# Patient Record
Sex: FEMALE | Race: WHITE | NOT HISPANIC OR LATINO | Employment: OTHER | URBAN - METROPOLITAN AREA
[De-identification: names, ages, dates, MRNs, and addresses within clinical notes are randomized per-mention and may not be internally consistent; named-entity substitution may affect disease eponyms.]

---

## 2017-01-06 ENCOUNTER — OFFICE VISIT - HEALTHEAST (OUTPATIENT)
Dept: PHYSICAL THERAPY | Facility: REHABILITATION | Age: 67
End: 2017-01-06

## 2017-01-06 DIAGNOSIS — R26.9 ABNORMALITY OF GAIT: ICD-10-CM

## 2017-01-06 DIAGNOSIS — M62.81 GENERALIZED MUSCLE WEAKNESS: ICD-10-CM

## 2017-01-06 DIAGNOSIS — M25.662 KNEE STIFFNESS, LEFT: ICD-10-CM

## 2017-01-06 DIAGNOSIS — G89.29 CHRONIC PAIN OF LEFT KNEE: ICD-10-CM

## 2017-01-06 DIAGNOSIS — Z98.890 HISTORY OF SURGERY: ICD-10-CM

## 2017-01-06 DIAGNOSIS — R26.81 UNSTEADINESS ON FEET: ICD-10-CM

## 2017-01-06 DIAGNOSIS — M25.562 CHRONIC PAIN OF LEFT KNEE: ICD-10-CM

## 2017-03-14 ENCOUNTER — COMMUNICATION - HEALTHEAST (OUTPATIENT)
Dept: INTERNAL MEDICINE | Facility: CLINIC | Age: 67
End: 2017-03-14

## 2017-03-14 DIAGNOSIS — G89.29 CHRONIC PAIN: ICD-10-CM

## 2017-07-09 ENCOUNTER — COMMUNICATION - HEALTHEAST (OUTPATIENT)
Dept: INTERNAL MEDICINE | Facility: CLINIC | Age: 67
End: 2017-07-09

## 2017-08-09 ASSESSMENT — MIFFLIN-ST. JEOR: SCORE: 1916.61

## 2017-08-11 ENCOUNTER — RECORDS - HEALTHEAST (OUTPATIENT)
Dept: ADMINISTRATIVE | Facility: OTHER | Age: 67
End: 2017-08-11

## 2017-08-14 ENCOUNTER — ANESTHESIA - HEALTHEAST (OUTPATIENT)
Dept: SURGERY | Facility: CLINIC | Age: 67
End: 2017-08-14

## 2017-08-14 ENCOUNTER — SURGERY - HEALTHEAST (OUTPATIENT)
Dept: SURGERY | Facility: CLINIC | Age: 67
End: 2017-08-14

## 2017-08-14 ASSESSMENT — MIFFLIN-ST. JEOR: SCORE: 1925.69

## 2017-08-15 ASSESSMENT — MIFFLIN-ST. JEOR: SCORE: 1925.69

## 2017-08-22 ENCOUNTER — OFFICE VISIT - HEALTHEAST (OUTPATIENT)
Dept: GERIATRICS | Facility: CLINIC | Age: 67
End: 2017-08-22

## 2017-08-22 DIAGNOSIS — E11.9 DIABETES MELLITUS (H): ICD-10-CM

## 2017-08-22 DIAGNOSIS — R52 PAIN MANAGEMENT: ICD-10-CM

## 2017-08-22 DIAGNOSIS — Z96.651 HISTORY OF TOTAL KNEE ARTHROPLASTY, RIGHT: ICD-10-CM

## 2017-08-22 DIAGNOSIS — E87.5 HYPERKALEMIA: ICD-10-CM

## 2017-08-22 DIAGNOSIS — Z51.81 ANTICOAGULATION MANAGEMENT ENCOUNTER: ICD-10-CM

## 2017-08-22 DIAGNOSIS — E87.1 HYPONATREMIA: ICD-10-CM

## 2017-08-22 DIAGNOSIS — Z79.01 ANTICOAGULATION MANAGEMENT ENCOUNTER: ICD-10-CM

## 2017-08-23 ENCOUNTER — AMBULATORY - HEALTHEAST (OUTPATIENT)
Dept: GERIATRICS | Facility: CLINIC | Age: 67
End: 2017-08-23

## 2017-08-28 ENCOUNTER — RECORDS - HEALTHEAST (OUTPATIENT)
Dept: ADMINISTRATIVE | Facility: OTHER | Age: 67
End: 2017-08-28

## 2017-09-26 ENCOUNTER — OFFICE VISIT - HEALTHEAST (OUTPATIENT)
Dept: PHYSICAL THERAPY | Facility: REHABILITATION | Age: 67
End: 2017-09-26

## 2017-09-26 DIAGNOSIS — R26.81 UNSTEADY GAIT: ICD-10-CM

## 2017-09-26 DIAGNOSIS — G89.29 CHRONIC PAIN OF RIGHT KNEE: ICD-10-CM

## 2017-09-26 DIAGNOSIS — R26.81 UNSTEADINESS ON FEET: ICD-10-CM

## 2017-09-26 DIAGNOSIS — Z96.651 HISTORY OF TOTAL RIGHT KNEE REPLACEMENT: ICD-10-CM

## 2017-09-26 DIAGNOSIS — M62.81 GENERALIZED MUSCLE WEAKNESS: ICD-10-CM

## 2017-09-26 DIAGNOSIS — M25.561 CHRONIC PAIN OF RIGHT KNEE: ICD-10-CM

## 2017-09-26 DIAGNOSIS — M25.661 DECREASED ROM OF RIGHT KNEE: ICD-10-CM

## 2017-09-28 ENCOUNTER — OFFICE VISIT - HEALTHEAST (OUTPATIENT)
Dept: PHYSICAL THERAPY | Facility: REHABILITATION | Age: 67
End: 2017-09-28

## 2017-09-28 DIAGNOSIS — G89.29 CHRONIC PAIN OF RIGHT KNEE: ICD-10-CM

## 2017-09-28 DIAGNOSIS — Z96.651 HISTORY OF TOTAL RIGHT KNEE REPLACEMENT: ICD-10-CM

## 2017-09-28 DIAGNOSIS — M25.661 DECREASED ROM OF RIGHT KNEE: ICD-10-CM

## 2017-09-28 DIAGNOSIS — M25.561 CHRONIC PAIN OF RIGHT KNEE: ICD-10-CM

## 2017-09-28 DIAGNOSIS — R26.81 UNSTEADY GAIT: ICD-10-CM

## 2017-09-28 DIAGNOSIS — R26.81 UNSTEADINESS ON FEET: ICD-10-CM

## 2017-09-28 DIAGNOSIS — M62.81 GENERALIZED MUSCLE WEAKNESS: ICD-10-CM

## 2017-10-05 ENCOUNTER — OFFICE VISIT - HEALTHEAST (OUTPATIENT)
Dept: PHYSICAL THERAPY | Facility: REHABILITATION | Age: 67
End: 2017-10-05

## 2017-10-05 DIAGNOSIS — M25.561 CHRONIC PAIN OF RIGHT KNEE: ICD-10-CM

## 2017-10-05 DIAGNOSIS — M62.81 GENERALIZED MUSCLE WEAKNESS: ICD-10-CM

## 2017-10-05 DIAGNOSIS — G89.29 CHRONIC PAIN OF RIGHT KNEE: ICD-10-CM

## 2017-10-05 DIAGNOSIS — Z96.651 HISTORY OF TOTAL RIGHT KNEE REPLACEMENT: ICD-10-CM

## 2017-10-05 DIAGNOSIS — M25.661 DECREASED ROM OF RIGHT KNEE: ICD-10-CM

## 2017-10-05 DIAGNOSIS — R26.81 UNSTEADY GAIT: ICD-10-CM

## 2017-10-05 DIAGNOSIS — R26.81 UNSTEADINESS ON FEET: ICD-10-CM

## 2017-10-19 ENCOUNTER — OFFICE VISIT - HEALTHEAST (OUTPATIENT)
Dept: PHYSICAL THERAPY | Facility: REHABILITATION | Age: 67
End: 2017-10-19

## 2017-10-19 DIAGNOSIS — M25.561 BILATERAL KNEE PAIN: ICD-10-CM

## 2017-10-19 DIAGNOSIS — G89.29 CHRONIC PAIN OF LEFT KNEE: ICD-10-CM

## 2017-10-19 DIAGNOSIS — R26.9 ABNORMALITY OF GAIT: ICD-10-CM

## 2017-10-19 DIAGNOSIS — R26.81 UNSTEADY GAIT: ICD-10-CM

## 2017-10-19 DIAGNOSIS — M25.562 BILATERAL KNEE PAIN: ICD-10-CM

## 2017-10-19 DIAGNOSIS — M25.661 KNEE STIFFNESS, RIGHT: ICD-10-CM

## 2017-10-19 DIAGNOSIS — Z96.651 HISTORY OF TOTAL RIGHT KNEE REPLACEMENT: ICD-10-CM

## 2017-10-19 DIAGNOSIS — Z98.890 HISTORY OF SURGERY: ICD-10-CM

## 2017-10-19 DIAGNOSIS — M25.561 CHRONIC PAIN OF RIGHT KNEE: ICD-10-CM

## 2017-10-19 DIAGNOSIS — M25.661 DECREASED ROM OF RIGHT KNEE: ICD-10-CM

## 2017-10-19 DIAGNOSIS — M62.81 GENERALIZED MUSCLE WEAKNESS: ICD-10-CM

## 2017-10-19 DIAGNOSIS — G89.29 CHRONIC PAIN OF RIGHT KNEE: ICD-10-CM

## 2017-10-19 DIAGNOSIS — M25.562 CHRONIC PAIN OF LEFT KNEE: ICD-10-CM

## 2017-10-19 DIAGNOSIS — M25.662 KNEE STIFFNESS, LEFT: ICD-10-CM

## 2017-10-19 DIAGNOSIS — R26.81 UNSTEADINESS ON FEET: ICD-10-CM

## 2017-10-26 ENCOUNTER — OFFICE VISIT - HEALTHEAST (OUTPATIENT)
Dept: PHYSICAL THERAPY | Facility: REHABILITATION | Age: 67
End: 2017-10-26

## 2017-10-26 DIAGNOSIS — G89.29 CHRONIC PAIN OF RIGHT KNEE: ICD-10-CM

## 2017-10-26 DIAGNOSIS — R26.81 UNSTEADY GAIT: ICD-10-CM

## 2017-10-26 DIAGNOSIS — M25.661 DECREASED ROM OF RIGHT KNEE: ICD-10-CM

## 2017-10-26 DIAGNOSIS — M62.81 GENERALIZED MUSCLE WEAKNESS: ICD-10-CM

## 2017-10-26 DIAGNOSIS — Z96.651 HISTORY OF TOTAL RIGHT KNEE REPLACEMENT: ICD-10-CM

## 2017-10-26 DIAGNOSIS — M25.561 CHRONIC PAIN OF RIGHT KNEE: ICD-10-CM

## 2017-10-26 DIAGNOSIS — R26.81 UNSTEADINESS ON FEET: ICD-10-CM

## 2017-11-09 ENCOUNTER — OFFICE VISIT - HEALTHEAST (OUTPATIENT)
Dept: PHYSICAL THERAPY | Facility: REHABILITATION | Age: 67
End: 2017-11-09

## 2017-11-09 DIAGNOSIS — M25.561 CHRONIC PAIN OF RIGHT KNEE: ICD-10-CM

## 2017-11-09 DIAGNOSIS — M62.81 GENERALIZED MUSCLE WEAKNESS: ICD-10-CM

## 2017-11-09 DIAGNOSIS — R26.81 UNSTEADY GAIT: ICD-10-CM

## 2017-11-09 DIAGNOSIS — G89.29 CHRONIC PAIN OF RIGHT KNEE: ICD-10-CM

## 2017-11-09 DIAGNOSIS — R26.81 UNSTEADINESS ON FEET: ICD-10-CM

## 2017-11-09 DIAGNOSIS — M25.661 DECREASED ROM OF RIGHT KNEE: ICD-10-CM

## 2017-11-15 ENCOUNTER — OFFICE VISIT - HEALTHEAST (OUTPATIENT)
Dept: PHYSICAL THERAPY | Facility: REHABILITATION | Age: 67
End: 2017-11-15

## 2017-11-15 DIAGNOSIS — M25.661 DECREASED ROM OF RIGHT KNEE: ICD-10-CM

## 2017-11-15 DIAGNOSIS — M25.561 CHRONIC PAIN OF RIGHT KNEE: ICD-10-CM

## 2017-11-15 DIAGNOSIS — Z96.651 HISTORY OF TOTAL RIGHT KNEE REPLACEMENT: ICD-10-CM

## 2017-11-15 DIAGNOSIS — G89.29 CHRONIC PAIN OF RIGHT KNEE: ICD-10-CM

## 2017-11-15 DIAGNOSIS — M62.81 GENERALIZED MUSCLE WEAKNESS: ICD-10-CM

## 2017-11-15 DIAGNOSIS — R26.81 UNSTEADINESS ON FEET: ICD-10-CM

## 2017-11-15 DIAGNOSIS — R26.81 UNSTEADY GAIT: ICD-10-CM

## 2017-11-22 ENCOUNTER — OFFICE VISIT - HEALTHEAST (OUTPATIENT)
Dept: PHYSICAL THERAPY | Facility: REHABILITATION | Age: 67
End: 2017-11-22

## 2017-11-22 DIAGNOSIS — M25.661 DECREASED ROM OF RIGHT KNEE: ICD-10-CM

## 2017-11-22 DIAGNOSIS — M25.561 CHRONIC PAIN OF RIGHT KNEE: ICD-10-CM

## 2017-11-22 DIAGNOSIS — M62.81 GENERALIZED MUSCLE WEAKNESS: ICD-10-CM

## 2017-11-22 DIAGNOSIS — G89.29 CHRONIC PAIN OF RIGHT KNEE: ICD-10-CM

## 2017-11-22 DIAGNOSIS — Z96.651 HISTORY OF TOTAL RIGHT KNEE REPLACEMENT: ICD-10-CM

## 2017-11-22 DIAGNOSIS — R26.81 UNSTEADY GAIT: ICD-10-CM

## 2017-11-22 DIAGNOSIS — R26.81 UNSTEADINESS ON FEET: ICD-10-CM

## 2017-11-29 ENCOUNTER — OFFICE VISIT - HEALTHEAST (OUTPATIENT)
Dept: PHYSICAL THERAPY | Facility: REHABILITATION | Age: 67
End: 2017-11-29

## 2017-11-29 DIAGNOSIS — G89.29 CHRONIC PAIN OF RIGHT KNEE: ICD-10-CM

## 2017-11-29 DIAGNOSIS — Z96.651 HISTORY OF TOTAL RIGHT KNEE REPLACEMENT: ICD-10-CM

## 2017-11-29 DIAGNOSIS — R26.81 UNSTEADINESS ON FEET: ICD-10-CM

## 2017-11-29 DIAGNOSIS — M25.561 CHRONIC PAIN OF RIGHT KNEE: ICD-10-CM

## 2017-11-29 DIAGNOSIS — M25.661 DECREASED ROM OF RIGHT KNEE: ICD-10-CM

## 2017-11-29 DIAGNOSIS — M62.81 GENERALIZED MUSCLE WEAKNESS: ICD-10-CM

## 2017-11-29 DIAGNOSIS — R26.81 UNSTEADY GAIT: ICD-10-CM

## 2017-12-06 ENCOUNTER — OFFICE VISIT - HEALTHEAST (OUTPATIENT)
Dept: PHYSICAL THERAPY | Facility: REHABILITATION | Age: 67
End: 2017-12-06

## 2017-12-06 DIAGNOSIS — M25.561 CHRONIC PAIN OF RIGHT KNEE: ICD-10-CM

## 2017-12-06 DIAGNOSIS — G89.29 CHRONIC PAIN OF RIGHT KNEE: ICD-10-CM

## 2017-12-06 DIAGNOSIS — M25.661 DECREASED ROM OF RIGHT KNEE: ICD-10-CM

## 2017-12-06 DIAGNOSIS — R26.81 UNSTEADINESS ON FEET: ICD-10-CM

## 2017-12-06 DIAGNOSIS — M62.81 GENERALIZED MUSCLE WEAKNESS: ICD-10-CM

## 2017-12-06 DIAGNOSIS — R26.81 UNSTEADY GAIT: ICD-10-CM

## 2017-12-06 DIAGNOSIS — Z96.651 HISTORY OF TOTAL RIGHT KNEE REPLACEMENT: ICD-10-CM

## 2017-12-13 ENCOUNTER — OFFICE VISIT - HEALTHEAST (OUTPATIENT)
Dept: PHYSICAL THERAPY | Facility: REHABILITATION | Age: 67
End: 2017-12-13

## 2017-12-13 DIAGNOSIS — M25.561 CHRONIC PAIN OF RIGHT KNEE: ICD-10-CM

## 2017-12-13 DIAGNOSIS — R26.81 UNSTEADINESS ON FEET: ICD-10-CM

## 2017-12-13 DIAGNOSIS — R26.81 UNSTEADY GAIT: ICD-10-CM

## 2017-12-13 DIAGNOSIS — G89.29 CHRONIC PAIN OF RIGHT KNEE: ICD-10-CM

## 2017-12-13 DIAGNOSIS — Z96.651 HISTORY OF TOTAL RIGHT KNEE REPLACEMENT: ICD-10-CM

## 2017-12-13 DIAGNOSIS — M25.661 DECREASED ROM OF RIGHT KNEE: ICD-10-CM

## 2017-12-13 DIAGNOSIS — M62.81 GENERALIZED MUSCLE WEAKNESS: ICD-10-CM

## 2017-12-20 ENCOUNTER — OFFICE VISIT - HEALTHEAST (OUTPATIENT)
Dept: PHYSICAL THERAPY | Facility: REHABILITATION | Age: 67
End: 2017-12-20

## 2017-12-20 DIAGNOSIS — R26.81 UNSTEADINESS ON FEET: ICD-10-CM

## 2017-12-20 DIAGNOSIS — M62.81 GENERALIZED MUSCLE WEAKNESS: ICD-10-CM

## 2017-12-20 DIAGNOSIS — R26.81 UNSTEADY GAIT: ICD-10-CM

## 2017-12-20 DIAGNOSIS — M25.561 CHRONIC PAIN OF RIGHT KNEE: ICD-10-CM

## 2017-12-20 DIAGNOSIS — Z96.651 HISTORY OF TOTAL RIGHT KNEE REPLACEMENT: ICD-10-CM

## 2017-12-20 DIAGNOSIS — M25.661 DECREASED ROM OF RIGHT KNEE: ICD-10-CM

## 2017-12-20 DIAGNOSIS — G89.29 CHRONIC PAIN OF RIGHT KNEE: ICD-10-CM

## 2018-04-23 ENCOUNTER — AMBULATORY - HEALTHEAST (OUTPATIENT)
Dept: PODIATRY | Facility: CLINIC | Age: 68
End: 2018-04-23

## 2018-04-23 DIAGNOSIS — Z79.4 TYPE 2 DIABETES MELLITUS WITHOUT COMPLICATION, WITH LONG-TERM CURRENT USE OF INSULIN (H): ICD-10-CM

## 2018-04-23 DIAGNOSIS — E11.9 TYPE 2 DIABETES MELLITUS WITHOUT COMPLICATION, WITH LONG-TERM CURRENT USE OF INSULIN (H): ICD-10-CM

## 2018-04-23 DIAGNOSIS — M21.6X1 PRONATION DEFORMITY OF BOTH FEET: ICD-10-CM

## 2018-04-23 DIAGNOSIS — L60.2 ONYCHAUXIS: ICD-10-CM

## 2018-04-23 DIAGNOSIS — M21.6X2 PRONATION DEFORMITY OF BOTH FEET: ICD-10-CM

## 2018-04-23 DIAGNOSIS — L84 TYLOMA: ICD-10-CM

## 2018-04-27 ENCOUNTER — COMMUNICATION - HEALTHEAST (OUTPATIENT)
Dept: OTHER | Facility: CLINIC | Age: 68
End: 2018-04-27

## 2018-04-30 ENCOUNTER — COMMUNICATION - HEALTHEAST (OUTPATIENT)
Dept: SLEEP MEDICINE | Facility: CLINIC | Age: 68
End: 2018-04-30

## 2018-05-02 ENCOUNTER — OFFICE VISIT (OUTPATIENT)
Dept: OPHTHALMOLOGY | Facility: CLINIC | Age: 68
End: 2018-05-02
Attending: OPHTHALMOLOGY
Payer: MEDICARE

## 2018-05-02 DIAGNOSIS — E11.9 TYPE 2 DIABETES MELLITUS WITHOUT COMPLICATION, UNSPECIFIED LONG TERM INSULIN USE STATUS: ICD-10-CM

## 2018-05-02 DIAGNOSIS — H02.836 DERMATOCHALASIS OF EYELIDS OF BOTH EYES: ICD-10-CM

## 2018-05-02 DIAGNOSIS — H02.59 FLOPPY EYELID SYNDROME: ICD-10-CM

## 2018-05-02 DIAGNOSIS — H02.833 DERMATOCHALASIS OF EYELIDS OF BOTH EYES: ICD-10-CM

## 2018-05-02 DIAGNOSIS — Z96.1 PSEUDOPHAKIA OF BOTH EYES: Primary | ICD-10-CM

## 2018-05-02 PROCEDURE — G0463 HOSPITAL OUTPT CLINIC VISIT: HCPCS | Mod: ZF

## 2018-05-02 PROCEDURE — 92015 DETERMINE REFRACTIVE STATE: CPT | Mod: GY,ZF

## 2018-05-02 ASSESSMENT — CONF VISUAL FIELD
METHOD: COUNTING FINGERS
OD_NORMAL: 1
OS_NORMAL: 1

## 2018-05-02 ASSESSMENT — EXTERNAL EXAM - LEFT EYE: OS_EXAM: NORMAL

## 2018-05-02 ASSESSMENT — REFRACTION_MANIFEST
OS_SPHERE: -0.50
OS_CYLINDER: +0.50
OD_CYLINDER: +0.50
OS_ADD: +2.50
OD_AXIS: 165
OS_AXIS: 180
OD_ADD: +2.50
OD_SPHERE: -0.50

## 2018-05-02 ASSESSMENT — CUP TO DISC RATIO
OS_RATIO: 0.2
OD_RATIO: 0.2

## 2018-05-02 ASSESSMENT — TONOMETRY
OD_IOP_MMHG: 21
IOP_METHOD: TONOPEN
OS_IOP_MMHG: 21

## 2018-05-02 ASSESSMENT — VISUAL ACUITY
OD_SC+: -2
OD_SC: 20/25
OS_SC+: -1
METHOD: SNELLEN - LINEAR
OS_SC: 20/25

## 2018-05-02 NOTE — MR AVS SNAPSHOT
After Visit Summary   2018    Binta Dennis    MRN: [id number]           Patient Information     Date Of Birth          1950        Visit Information        Provider Department      2018 2:15 PM [facility]y [facility]azo [facility]        Today's Diagnoses     Pseudophakia of both eyes    -  1    Floppy eyelid syndrome        Type 2 diabetes mellitus without complication, unspecified long term insulin use status (H)        Dermatochalasis of eyelids of both eyes           Follow-ups after your visit        Additional Services     Plastics Eye Referral                 Follow-up notes from your care team     Return in about 1 year (around 2019) for Annual Visit.      Your next 10 appointments already scheduled     May 29, 2018  1:15 PM CDT   (Arrive by 1:00 PM)   NEW PLASTICS with Michelle Garner MD   [facility] ([facility])    [address] [place] [id number] [phone]              Who to contact     Please call your clinic at [phone] to:    Ask questions about your health    Make or cancel appointments    Discuss your medicines    Learn about your test results    Speak to your doctor            Additional Information About Your Visit        MyChart Information     [employer] is an electronic gateway that provides easy, online access to your medical records. With Bloxy, you can request a clinic appointment, read your test results, renew a prescription or communicate with your care team.     To sign up for [employer] visit the website at [email or web address]   You will be asked to enter the access code listed below, as well as some personal information. Please follow the directions to create your username and password.     Your access code is: [email or web address]  Expires: 2018  6:30 AM     Your access code will  in 90 days. If you need help or a new code, please contact your [facility] or call [phone] for assistance.        Care EveryWhere ID     This is your Care EveryWhere ID. This could be used by other organizations to access your [facility] medical records  [id number]         Blood Pressure from Last 3 Encounters:   05/16/16 95/54   08/17/15 126/69    Weight from Last 3 Encounters:   05/13/16 (!) 144.7 kg (319 lb)   08/14/15 (!) 145.2 kg (320 lb)              We Performed the Following     Plastics Eye Referral          Today's Medication Changes          These changes are accurate as of 5/2/18  3:10 PM.  If you have any questions, ask your nurse or doctor.               Stop taking these medicines if you haven't already. Please contact your care team if you have questions.     celecoxib 200 MG capsule   Commonly known as:  celeBREX   Stopped by:  Addy Razo MD           TRAMADOL HCL PO   Stopped by:  Addy Razo MD                    Primary Care Provider Office Phone # Fax #    Mike ZEPEDA MD Josef [phone]       [facility] [address] [place] [address]        Equal Access to Services     [facility]: Hadii aad ku hadasho Soomaali, waaxda luqadaha, qaybta kaalmada adeegyada, iqra ambrose . So [facility] [phone].    ATENCIÓN: Si habla español, tiene a garcia disposición servicios gratuitos de asistencia lingüística. Llame al [phone].    We comply with applicable federal civil rights laws and [place] laws. We do not discriminate on the basis of race, color, national origin, age, disability, sex, sexual orientation, or gender identity.            Thank you!     Thank you for choosing [facility]IC  for your care. Our goal is always to provide you with excellent care. Hearing back from our patients is one way we can continue to improve our services. Please take a few minutes to complete the written survey that you may receive in the mail after your visit with us. Thank you!              Your Updated Medication List - Protect others around you: Learn how to safely use, store and throw away your medicines at www.disposemymeds.org.          This list is accurate as of 5/2/18  3:10 PM.  Always use your most recent med list.                   Brand Name Dispense Instructions for use Diagnosis    ACETAMINOPHEN PO      Take by mouth as needed        allopurinol 300 MG tablet    ZYLOPRIM     Take 300 mg by mouth daily        ARTIFICIAL TEAR OP      Apply 1 drop to eye daily        hydrochlorothiazide 12.5 MG capsule    MICROZIDE     Take 25 mg by mouth daily        LEVEMIR FLEXPEN SC      Inject 39 Units Subcutaneous daily        lisinopril 10 MG tablet    PRINIVIL/ZESTRIL     Take 10 mg by mouth daily        metFORMIN 1000 MG tablet    GLUCOPHAGE     Take 1,000 mg by mouth 2 times daily (with meals)        prednisoLONE acetate 1 % ophthalmic susp    PRED FORTE    1 Bottle    Operated eye  Start the day after surgery 1 drop four times a day for 1 week, then three times a day for 1 week, then twice a day for 1 week, then once a day for 1 week, then stop    Cataract       SIMVASTATIN PO      Take 10 mg by mouth At Bedtime        VITAMIN D (CHOLECALCIFEROL) PO      Take by mouth daily

## 2018-05-02 NOTE — NURSING NOTE
Chief Complaints and History of Present Illnesses   Patient presents with     Diabetic Eye Exam     Hx of Pseudophakia - Both Eyes, Amblyopia, left, Borderline glaucoma with ocular hypertension, bilateral, Floppy eyelid syndrome, Diabetes mellitus without complication     HPI    Affected eye(s):  Both   Symptoms:     No blurred vision   No decreased vision         Do you have eye pain now?:  No      Comments:  Jenny LLAMAS 2:07 PM May 2, 2018

## 2018-05-02 NOTE — PROGRESS NOTES
"Chief Complaints and History of Present Illnesses   Patient presents with     Diabetic Eye Exam     Hx of Pseudophakia - Both Eyes, Amblyopia, left, Borderline glaucoma with ocular hypertension, bilateral, Floppy eyelid syndrome, Diabetes mellitus without complication     Patient presents for follow-up of pseudophakia both eyes, boarderline glaucoma, FES, and Diabetes mellitus.  Last A1c 11.7 on 1/25/18, patient admits poor compliance with blood sugars but doing better as of late.  States no new problems with her eyes today.      Assessment & Plan      Binta Dennis is a 65 year old female with the following diagnoses:   1. Pseudophakia of both eyes    2. Floppy eyelid syndrome    3. Type 2 diabetes mellitus without complication, unspecified long term insulin use status (H)         Very mild nonproliferative diabetic retinopathy changes in both eyes.   Discussed the importance of good blood pressure and sugar management at length, patient expresses understanding  The results of the refraction was discussed with the patient and a new glasses prescription was provided.        Bothered by drooping eyelids and \"buldging\" at nasal upper lids.  Recommend consult with oculoplastics, referral placed    Recheck 1 year       Pop Herrera M.D.  PGY-2, Ophthalmology     Attending Physician Attestation:  Complete documentation of historical and exam elements from today's encounter can be found in the full encounter summary report (not reduplicated in this progress note).  I personally obtained the chief complaint(s) and history of present illness.  I confirmed and edited as necessary the review of systems, past medical/surgical history, family history, social history, and examination findings as documented by others; and I examined the patient myself.  I personally reviewed the relevant tests, images, and reports as documented above.  I formulated and edited as necessary the assessment and plan and discussed the findings and " management plan with the patient and family. . - Addy Razo MD

## 2018-05-14 ENCOUNTER — AMBULATORY - HEALTHEAST (OUTPATIENT)
Dept: OTHER | Facility: CLINIC | Age: 68
End: 2018-05-14

## 2018-05-14 NOTE — TELEPHONE ENCOUNTER
FUTURE VISIT INFORMATION      FUTURE VISIT INFORMATION:    Date: 05/29/18    Time: 115pm    Location: [facility]  REFERRAL INFORMATION:    Referring provider:  LENO HORTON    Referring providers clinic:  [facility]    Reason for visit/diagnosis  PLASTIC EVAL    RECORDS REQUESTED FROM:       Clinic name Comments Records Status Imaging Status   [facility] Notes in EPIC In EPic                                    RECORDS STATUS

## 2018-05-23 ENCOUNTER — COMMUNICATION - HEALTHEAST (OUTPATIENT)
Dept: ADMINISTRATIVE | Facility: CLINIC | Age: 68
End: 2018-05-23

## 2018-05-24 ENCOUNTER — AMBULATORY - HEALTHEAST (OUTPATIENT)
Dept: OTHER | Facility: CLINIC | Age: 68
End: 2018-05-24

## 2018-05-24 DIAGNOSIS — L84 CORNS AND CALLOSITIES: ICD-10-CM

## 2018-05-24 DIAGNOSIS — M21.6X1 OTHER ACQUIRED DEFORMITIES OF RIGHT FOOT: ICD-10-CM

## 2018-05-24 DIAGNOSIS — E11.9 TYPE 2 DIABETES MELLITUS WITHOUT COMPLICATIONS (H): ICD-10-CM

## 2018-05-24 DIAGNOSIS — M21.6X2 OTHER ACQUIRED DEFORMITIES OF LEFT FOOT: ICD-10-CM

## 2018-05-29 ENCOUNTER — OFFICE VISIT (OUTPATIENT)
Dept: OPHTHALMOLOGY | Facility: CLINIC | Age: 68
End: 2018-05-29
Payer: COMMERCIAL

## 2018-05-29 ENCOUNTER — PRE VISIT (OUTPATIENT)
Dept: OPHTHALMOLOGY | Facility: CLINIC | Age: 68
End: 2018-05-29

## 2018-05-29 ENCOUNTER — DOCUMENTATION ONLY (OUTPATIENT)
Dept: OPHTHALMOLOGY | Facility: CLINIC | Age: 68
End: 2018-05-29

## 2018-05-29 VITALS — BODY MASS INDEX: 50.02 KG/M2 | WEIGHT: 293 LBS | HEIGHT: 64 IN

## 2018-05-29 DIAGNOSIS — H02.833 DERMATOCHALASIS OF EYELIDS OF BOTH EYES: ICD-10-CM

## 2018-05-29 DIAGNOSIS — H02.836 DERMATOCHALASIS OF EYELIDS OF BOTH EYES: ICD-10-CM

## 2018-05-29 DIAGNOSIS — H02.403 ACQUIRED INVOLUTIONAL PTOSIS OF BOTH EYELIDS: ICD-10-CM

## 2018-05-29 DIAGNOSIS — H02.59 FLOPPY EYELID SYNDROME: Primary | ICD-10-CM

## 2018-05-29 ASSESSMENT — VISUAL ACUITY
METHOD: SNELLEN - LINEAR
OS_SC+: -3
OD_SC: 20/25
OS_SC: 20/20
OD_SC+: -2

## 2018-05-29 ASSESSMENT — TONOMETRY
IOP_METHOD: ICARE
OD_IOP_MMHG: 22
OS_IOP_MMHG: 18

## 2018-05-29 ASSESSMENT — EXTERNAL EXAM - LEFT EYE: OS_EXAM: NORMAL

## 2018-05-29 NOTE — PROGRESS NOTES
Oculoplastic Clinic New Patient    Patient: Binta Dennis MRN# [id number]   YOB: 1950 Age: 67 year old   Date of Visit: May 29, 2018    CC: Droopy eyelids obstructing vision.  Chief Complaints and History of Present Illnesses   Patient presents with     Consult For     drooping eyelids            HPI:     Binta Dennis is a 67 year old female who has noted gradual onset of droopy eyelids over the past years. The droopy eyelid is interfering with activities of daily living including driving, and reading. The patient denies double vision, variability of the eyelid position, or dry eye symptoms.   Difficulty keeping eyes open when driving, has to really raise brows and make conscious effort; similar difficulty reading  Occasional dry eyes, uses artificial tears once and a while  No changes in vision  Hx Gout (allopurinol), HTN (HCTZ), DM II (Levemir insulin for glucose control), ELI uses CPAP  Strabismus surgery both eyes 20 yrs ago, cataract surgery    EXAM:     MRD1: 2mm R, 2mm L  Dermatochalasis with excess skin touching eyelashes   Prolapsed nasal fat pad    VISUAL FIELD:  Right eye untaped:18 degrees Right eye taped:52 degrees  Left eye untaped:14 degrees Left eye taped:52 degrees    Assessment & Plan     Binta Dennis is a 67 year old female with the following diagnoses:   Encounter Diagnoses   Name Primary?     Floppy eyelid syndrome Yes     Dermatochalasis of eyelids of both eyes      Acquired involutional ptosis of both eyelids      Her ptosis is significantly worse nasally    Bilateral ptosis repair external levator approach (Skin, N+CF, Nasal levator)        ANTICOAGULATION:  Aspirin 81 mg daily    Can hold prior to surgery  Will cc PCP regarding holding anticoagulation     PHOTOS DEMONSTRATE:    Myogenic blepharoptosis  Brow ptosis with thicker brow skin and hairs below the lateral superior orbital rim    David Short PGY-3  Plastic Surgery Resident    Complete documentation of historical  and exam elements from today's encounter can be found in the full encounter summary report (not reduplicated in this progress note). I personally obtained the chief complaint(s) and history of present illness.  I confirmed and edited as necessary the review of systems, past medical/surgical history, family history, social history, and examination findings as documented by others; and I examined the patient myself. I personally reviewed the relevant tests, images, and reports as documented above. I formulated and edited as necessary the assessment and plan and discussed the findings and management plan with the patient and family. - Michelle Garner MD      Today with Binta Dennis, I reviewed the indications, risks, benefits, and alternatives of the proposed surgical procedure including, but not limited to, failure obtain the desired result  and need for additional surgery, bleeding, infection, loss of vision, loss of the eye, and the remote possibility of permanent damage to any organ system or death with the use of anesthesia.  I provided multiple opportunities for the questions, answered all questions to the best of my ability, and confirmed that my answers and my discussion were understood.

## 2018-05-29 NOTE — LETTER
2018         RE:  :  MRN: Binta Dennis  1950  [id number]     Dear Dr. Razo,    Thank you for asking me to see your patient, Binta Dennis, for an oculoplastic   consultation.  My assessment and plan are below.      Oculoplastic Clinic New Patient     Patient: Binta Dennis MRN# [id number]   YOB: 1950 Age: 67 year old   Date of Visit: May 29, 2018     CC: Droopy eyelids obstructing vision.       Chief Complaints and History of Present Illnesses   Patient presents with     Consult For       drooping eyelids              HPI:      Binta Dennis is a 67 year old female who has noted gradual onset of droopy eyelids over the past years. The droopy eyelid is interfering with activities of daily living including driving, and reading. The patient denies double vision, variability of the eyelid position, or dry eye symptoms.   Difficulty keeping eyes open when driving, has to really raise brows and make conscious effort; similar difficulty reading  Occasional dry eyes, uses artificial tears once and a while  No changes in vision  Hx Gout (allopurinol), HTN (HCTZ), DM II (Levemir insulin for glucose control), ELI uses CPAP  Strabismus surgery both eyes 20 yrs ago, cataract surgery     EXAM:      MRD1: 2mm R, 2mm L  Dermatochalasis with excess skin touching eyelashes   Prolapsed nasal fat pad     VISUAL FIELD:  Right eye untaped:18            degrees                     Right eye taped:52 degrees  Left eye untaped:14              degrees                     Left eye taped:52 degrees     Assessment & Plan      Binta Dennis is a 67 year old female with the following diagnoses:        Encounter Diagnoses   Name Primary?     Floppy eyelid syndrome Yes     Dermatochalasis of eyelids of both eyes       Acquired involutional ptosis of both eyelids        Her ptosis is significantly worse nasally     Bilateral ptosis repair external levator approach (Skin, N+CF, Nasal  levator)           ANTICOAGULATION:  Aspirin 81 mg daily     Can hold prior to surgery  Will cc PCP regarding holding anticoagulation      PHOTOS DEMONSTRATE:     Myogenic blepharoptosis  Brow ptosis with thicker brow skin and hairs below the lateral superior orbital rim     David Short PGY-3  Plastic Surgery Resident         Again, thank you for allowing me to participate in the care of your patient.      Sincerely,    Michelle Garner MD  Department of Ophthalmology and Visual Neurosciences  [facility]    CC: Addy Razo MD  [address] [place] [id number]  VIA In Basket

## 2018-05-29 NOTE — NURSING NOTE
Chief Complaints and History of Present Illnesses   Patient presents with     Consult For     drooping eyelids     HPI    Affected eye(s):  Both   Symptoms:     No blurred vision   Difficulty with reading      Frequency:  Constant       Do you have eye pain now?:  No      Comments:  Patient states the lids are resting on lashes. Affecting vision when trying to read at night. Puffy medially both eyes. Having to hold lids open, difficult to keep up. Having issues driving in bright lights. Unable to wear eye makeup. Using ATs HAFSAN     Kasia Samano COT 12:35 PM May 29, 2018

## 2018-05-29 NOTE — PROGRESS NOTES
Met with patient to schedule surgery with Dr. Michelle Garner.  Surgery was scheduled on 08/03 at [facility] due to BMI  Patient will have H&P at [facility]  Post-Op care appointment was scheduled on 08/16  Patient is aware a / is needed day of surgery.   Patient received surgery packet has my direct contact information for any further questions.

## 2018-05-31 ENCOUNTER — AMBULATORY - HEALTHEAST (OUTPATIENT)
Dept: OTHER | Facility: CLINIC | Age: 68
End: 2018-05-31

## 2018-06-20 NOTE — PROGRESS NOTES
Patient called asking to reschedule surgery to September due to family unavailable to bring her in Aug.     Surgery was scheduled on 09/21  Patient will have H&P at [facility]  Post-Op care appointment will be on 10/02  Patient is aware a / is needed day of surgery.   Surgery packet was mailed, patient has my direct contact information for any further questions.

## 2018-09-21 ENCOUNTER — ANESTHESIA EVENT (OUTPATIENT)
Dept: SURGERY | Facility: CLINIC | Age: 68
End: 2018-09-21
Payer: MEDICARE

## 2018-09-21 ENCOUNTER — HOSPITAL ENCOUNTER (OUTPATIENT)
Facility: CLINIC | Age: 68
Discharge: HOME | End: 2018-09-21
Attending: OPHTHALMOLOGY | Admitting: OPHTHALMOLOGY
Payer: MEDICARE

## 2018-09-21 ENCOUNTER — SURGERY (OUTPATIENT)
Age: 68
End: 2018-09-21

## 2018-09-21 ENCOUNTER — ANESTHESIA (OUTPATIENT)
Dept: SURGERY | Facility: CLINIC | Age: 68
End: 2018-09-21
Payer: MEDICARE

## 2018-09-21 VITALS
RESPIRATION RATE: 15 BRPM | HEART RATE: 83 BPM | SYSTOLIC BLOOD PRESSURE: 100 MMHG | OXYGEN SATURATION: 94 % | HEIGHT: 64 IN | DIASTOLIC BLOOD PRESSURE: 47 MMHG | TEMPERATURE: 97.2 F | BODY MASS INDEX: 50.02 KG/M2 | WEIGHT: 293 LBS

## 2018-09-21 DIAGNOSIS — Z98.890 POSTOPERATIVE EYE STATE: Primary | ICD-10-CM

## 2018-09-21 LAB
GLUCOSE BLDC GLUCOMTR-MCNC: 151 MG/DL (ref 70–99)
GLUCOSE BLDC GLUCOMTR-MCNC: 162 MG/DL (ref 70–99)

## 2018-09-21 PROCEDURE — 71000013 ZZH RECOVERY PHASE 1 LEVEL 1 EA ADDTL HR: Performed by: OPHTHALMOLOGY

## 2018-09-21 PROCEDURE — 25000132 ZZH RX MED GY IP 250 OP 250 PS 637: Mod: GY | Performed by: ANESTHESIOLOGY

## 2018-09-21 PROCEDURE — 25000128 H RX IP 250 OP 636: Performed by: NURSE ANESTHETIST, CERTIFIED REGISTERED

## 2018-09-21 PROCEDURE — 27210794 ZZH OR GENERAL SUPPLY STERILE: Performed by: OPHTHALMOLOGY

## 2018-09-21 PROCEDURE — 36000058 ZZH SURGERY LEVEL 3 EA 15 ADDTL MIN: Performed by: OPHTHALMOLOGY

## 2018-09-21 PROCEDURE — 82962 GLUCOSE BLOOD TEST: CPT

## 2018-09-21 PROCEDURE — 36000056 ZZH SURGERY LEVEL 3 1ST 30 MIN: Performed by: OPHTHALMOLOGY

## 2018-09-21 PROCEDURE — 71000027 ZZH RECOVERY PHASE 2 EACH 15 MINS: Performed by: OPHTHALMOLOGY

## 2018-09-21 PROCEDURE — 37000009 ZZH ANESTHESIA TECHNICAL FEE, EACH ADDTL 15 MIN: Performed by: OPHTHALMOLOGY

## 2018-09-21 PROCEDURE — 40000170 ZZH STATISTIC PRE-PROCEDURE ASSESSMENT II: Performed by: OPHTHALMOLOGY

## 2018-09-21 PROCEDURE — 37000008 ZZH ANESTHESIA TECHNICAL FEE, 1ST 30 MIN: Performed by: OPHTHALMOLOGY

## 2018-09-21 PROCEDURE — 25000125 ZZHC RX 250: Performed by: NURSE ANESTHETIST, CERTIFIED REGISTERED

## 2018-09-21 PROCEDURE — 25000125 ZZHC RX 250: Performed by: OPHTHALMOLOGY

## 2018-09-21 PROCEDURE — A9270 NON-COVERED ITEM OR SERVICE: HCPCS | Mod: GY | Performed by: ANESTHESIOLOGY

## 2018-09-21 PROCEDURE — 71000012 ZZH RECOVERY PHASE 1 LEVEL 1 FIRST HR: Performed by: OPHTHALMOLOGY

## 2018-09-21 RX ORDER — ERYTHROMYCIN 5 MG/G
0.5 OINTMENT OPHTHALMIC 3 TIMES DAILY
Qty: 3.5 G | Refills: 0 | Status: SHIPPED | OUTPATIENT
Start: 2018-09-21 | End: 2018-09-28

## 2018-09-21 RX ORDER — SODIUM CHLORIDE, SODIUM LACTATE, POTASSIUM CHLORIDE, CALCIUM CHLORIDE 600; 310; 30; 20 MG/100ML; MG/100ML; MG/100ML; MG/100ML
INJECTION, SOLUTION INTRAVENOUS CONTINUOUS PRN
Status: DISCONTINUED | OUTPATIENT
Start: 2018-09-21 | End: 2018-09-21

## 2018-09-21 RX ORDER — FENTANYL CITRATE 50 UG/ML
25-50 INJECTION, SOLUTION INTRAMUSCULAR; INTRAVENOUS
Status: DISCONTINUED | OUTPATIENT
Start: 2018-09-21 | End: 2018-09-21 | Stop reason: HOSPADM

## 2018-09-21 RX ORDER — TRAMADOL HYDROCHLORIDE 50 MG/1
50 TABLET ORAL ONCE
Status: COMPLETED | OUTPATIENT
Start: 2018-09-21 | End: 2018-09-21

## 2018-09-21 RX ORDER — HYDRALAZINE HYDROCHLORIDE 20 MG/ML
2.5-5 INJECTION INTRAMUSCULAR; INTRAVENOUS EVERY 10 MIN PRN
Status: DISCONTINUED | OUTPATIENT
Start: 2018-09-21 | End: 2018-09-21 | Stop reason: HOSPADM

## 2018-09-21 RX ORDER — ONDANSETRON 4 MG/1
4 TABLET, ORALLY DISINTEGRATING ORAL EVERY 30 MIN PRN
Status: DISCONTINUED | OUTPATIENT
Start: 2018-09-21 | End: 2018-09-21 | Stop reason: HOSPADM

## 2018-09-21 RX ORDER — LIDOCAINE HYDROCHLORIDE 20 MG/ML
INJECTION, SOLUTION INFILTRATION; PERINEURAL PRN
Status: DISCONTINUED | OUTPATIENT
Start: 2018-09-21 | End: 2018-09-21

## 2018-09-21 RX ORDER — SODIUM CHLORIDE, SODIUM LACTATE, POTASSIUM CHLORIDE, CALCIUM CHLORIDE 600; 310; 30; 20 MG/100ML; MG/100ML; MG/100ML; MG/100ML
INJECTION, SOLUTION INTRAVENOUS CONTINUOUS
Status: DISCONTINUED | OUTPATIENT
Start: 2018-09-21 | End: 2018-09-21 | Stop reason: HOSPADM

## 2018-09-21 RX ORDER — ONDANSETRON 2 MG/ML
4 INJECTION INTRAMUSCULAR; INTRAVENOUS EVERY 30 MIN PRN
Status: DISCONTINUED | OUTPATIENT
Start: 2018-09-21 | End: 2018-09-21 | Stop reason: HOSPADM

## 2018-09-21 RX ORDER — NALOXONE HYDROCHLORIDE 0.4 MG/ML
.1-.4 INJECTION, SOLUTION INTRAMUSCULAR; INTRAVENOUS; SUBCUTANEOUS
Status: DISCONTINUED | OUTPATIENT
Start: 2018-09-21 | End: 2018-09-21 | Stop reason: HOSPADM

## 2018-09-21 RX ORDER — ERYTHROMYCIN 5 MG/G
OINTMENT OPHTHALMIC PRN
Status: DISCONTINUED | OUTPATIENT
Start: 2018-09-21 | End: 2018-09-21 | Stop reason: HOSPADM

## 2018-09-21 RX ORDER — PROPOFOL 10 MG/ML
INJECTION, EMULSION INTRAVENOUS PRN
Status: DISCONTINUED | OUTPATIENT
Start: 2018-09-21 | End: 2018-09-21

## 2018-09-21 RX ORDER — HYDROMORPHONE HYDROCHLORIDE 1 MG/ML
.3-.5 INJECTION, SOLUTION INTRAMUSCULAR; INTRAVENOUS; SUBCUTANEOUS EVERY 10 MIN PRN
Status: DISCONTINUED | OUTPATIENT
Start: 2018-09-21 | End: 2018-09-21 | Stop reason: HOSPADM

## 2018-09-21 RX ORDER — ALBUTEROL SULFATE 0.83 MG/ML
2.5 SOLUTION RESPIRATORY (INHALATION) EVERY 4 HOURS PRN
Status: DISCONTINUED | OUTPATIENT
Start: 2018-09-21 | End: 2018-09-21 | Stop reason: HOSPADM

## 2018-09-21 RX ORDER — FENTANYL CITRATE 50 UG/ML
INJECTION, SOLUTION INTRAMUSCULAR; INTRAVENOUS PRN
Status: DISCONTINUED | OUTPATIENT
Start: 2018-09-21 | End: 2018-09-21

## 2018-09-21 RX ORDER — TETRACAINE HYDROCHLORIDE 5 MG/ML
SOLUTION OPHTHALMIC PRN
Status: DISCONTINUED | OUTPATIENT
Start: 2018-09-21 | End: 2018-09-21 | Stop reason: HOSPADM

## 2018-09-21 RX ORDER — MEPERIDINE HYDROCHLORIDE 25 MG/ML
12.5 INJECTION INTRAMUSCULAR; INTRAVENOUS; SUBCUTANEOUS
Status: DISCONTINUED | OUTPATIENT
Start: 2018-09-21 | End: 2018-09-21 | Stop reason: HOSPADM

## 2018-09-21 RX ADMIN — LIDOCAINE HYDROCHLORIDE,EPINEPHRINE BITARTRATE 8 ML: 20; .01 INJECTION, SOLUTION INFILTRATION; PERINEURAL at 08:40

## 2018-09-21 RX ADMIN — TETRACAINE HYDROCHLORIDE 1 DROP: 5 SOLUTION OPHTHALMIC at 08:39

## 2018-09-21 RX ADMIN — FENTANYL CITRATE 50 MCG: 50 INJECTION, SOLUTION INTRAMUSCULAR; INTRAVENOUS at 08:40

## 2018-09-21 RX ADMIN — PROPOFOL 30 MG: 10 INJECTION, EMULSION INTRAVENOUS at 08:41

## 2018-09-21 RX ADMIN — MIDAZOLAM 1 MG: 1 INJECTION INTRAMUSCULAR; INTRAVENOUS at 08:40

## 2018-09-21 RX ADMIN — LIDOCAINE HYDROCHLORIDE 40 MG: 20 INJECTION, SOLUTION INFILTRATION; PERINEURAL at 08:40

## 2018-09-21 RX ADMIN — SODIUM CHLORIDE, POTASSIUM CHLORIDE, SODIUM LACTATE AND CALCIUM CHLORIDE: 600; 310; 30; 20 INJECTION, SOLUTION INTRAVENOUS at 08:33

## 2018-09-21 RX ADMIN — PHENYLEPHRINE HYDROCHLORIDE 100 MCG: 10 INJECTION, SOLUTION INTRAMUSCULAR; INTRAVENOUS; SUBCUTANEOUS at 09:14

## 2018-09-21 RX ADMIN — PHENYLEPHRINE HYDROCHLORIDE 100 MCG: 10 INJECTION, SOLUTION INTRAMUSCULAR; INTRAVENOUS; SUBCUTANEOUS at 08:54

## 2018-09-21 RX ADMIN — TRAMADOL HYDROCHLORIDE 50 MG: 50 TABLET, COATED ORAL at 10:39

## 2018-09-21 RX ADMIN — MIDAZOLAM 0.5 MG: 1 INJECTION INTRAMUSCULAR; INTRAVENOUS at 08:57

## 2018-09-21 RX ADMIN — PROPOFOL 20 MG: 10 INJECTION, EMULSION INTRAVENOUS at 08:42

## 2018-09-21 RX ADMIN — ERYTHROMYCIN 1 G: 5 OINTMENT OPHTHALMIC at 08:52

## 2018-09-21 RX ADMIN — MIDAZOLAM 0.5 MG: 1 INJECTION INTRAMUSCULAR; INTRAVENOUS at 09:00

## 2018-09-21 RX ADMIN — DEXMEDETOMIDINE HYDROCHLORIDE 8 MCG: 100 INJECTION, SOLUTION INTRAVENOUS at 08:40

## 2018-09-21 RX ADMIN — DEXMEDETOMIDINE HYDROCHLORIDE 8 MCG: 100 INJECTION, SOLUTION INTRAVENOUS at 08:55

## 2018-09-21 ASSESSMENT — ENCOUNTER SYMPTOMS
DYSRHYTHMIAS: 0
SEIZURES: 0

## 2018-09-21 ASSESSMENT — LIFESTYLE VARIABLES: TOBACCO_USE: 0

## 2018-09-21 ASSESSMENT — COPD QUESTIONNAIRES: COPD: 0

## 2018-09-21 NOTE — DISCHARGE INSTRUCTIONS
Post-operative Instructions  Ophthalmic Plastic and Reconstructive Surgery    Michelle Garner M.D.     All instructions apply to the operated eye(s) or eyelid(s).    Wound care and personal care  ? If a patch or bandage has been placed, please leave this in place until seen by your physician. Ensure that the bandage does not get wet when you take a shower.  ? Apply ice compresses 15 minutes on 15 minutes off while awake for 2 days, then switch to warm water compresses 4 times a day until seen by your physician. For warm packs you can place a cup of dry uncooked rice in a clean cotton sock. Then place sock in microwave 30 seconds to one minute. Next place the warm sock into a plastic bag and wrap the bag with clean warm wet washcloth and place over operated eye.    ? You may shower or wash your hair the day after surgery. Do not bathe or go swimming for 1 week to prevent contamination of your wounds.  ? Do not apply make-up to the eyes or eyelids for 2 weeks after surgery.  ? Expect some swelling, bruising, black eye (even into the lower eyelids and cheeks). Also expect serum caking, crusting and discharge from the eye and/or incisions. A small amount of surface bleeding is normal for the first 48 hours.  ? Your eye(s) and eyelid(s) may be painful and tender. This is normal after surgery.      Contact information and follow-up  ? Return to the Eye Clinic for a follow-up appointment with your physician as  scheduled. If no appointment has been scheduled:   - [facility] clinic: [phone] for an appointment with Dr. Garner within 1 to 2 weeks from your date of surgery.   -  [facility] eye clinic: [phone] for an appointment with Dr. Garner within 1 to 2 weeks from your date of surgery.     ? For severe pain, bleeding, or loss of vision, call the [facility] at [phone] or [facility] at [phone].     After hours or on weekends  and holidays, call [phone] and ask to speak with the ophthalmologist on call.    An on call person can be reached after hours for concerns. The on call doctor should not call in medication refill requests after hours or on weekends, so please plan accordingly. An effort has been made to provide adequate pain medications following every surgery, and refills will not be provided in most instances. Narcotic pain medications cannot be called in.     Activity restrictions and driving  ? Avoid heavy lifting, bending, exercise or strenuous activity for 1 week after surgery.  You may resume other activities and return to work as tolerated.  ? You may not resume driving until have you stopped using narcotic pain medications (such as Norco, Percocet, Tylenol #3).    Medications  ? Restart all your regular home medications and eye drops. If you take Plavix or  Aspirin on a regular basis, wait for 72 hours after your surgery before restarting these in order to decrease the risk of bleeding complications.  ? Avoid aspirin and aspirin-like medications (Motrin, Aleve, Ibuprofen, Nasra-  [place] etc) for 72 hours to reduce the risk of bleeding. You may take Tylenol  (acetaminophen) for pain.  ? In addition to your home medications, take the following post-operative medications as prescribed by your physician.    ? Apply antibiotic ointment to all sutures three times a day, and into the operated eye(s) at night.    ? WARNING:  You must not take more than 4,000 mg of acetaminophen per  24-hour period.       Same Day Surgery Discharge Instructions for  Sedation and General Anesthesia       It's not unusual to feel dizzy, light-headed or faint for up to 24 hours after surgery or while taking pain medication.  If you have these symptoms: sit for a few minutes before standing and have someone assist you when you get up to walk or use the bathroom.      You should rest and relax for the next 24 hours. We recommend you make  arrangements to have an adult stay with you for at least 24 hours after your discharge.  Avoid hazardous and strenuous activity.      DO NOT DRIVE any vehicle or operate mechanical equipment for 24 hours following the end of your surgery.  Even though you may feel normal, your reactions may be affected by the medication you have received.      Do not drink alcoholic beverages for 24 hours following surgery.       Slowly progress to your regular diet as you feel able. It's not unusual to feel nauseated and/or vomit after receiving anesthesia.  If you develop these symptoms, drink clear liquids (apple juice, ginger ale, broth, 7-up, etc. ) until you feel better.  If your nausea and vomiting persists for 24 hours, please notify your surgeon.        All narcotic pain medications, along with inactivity and anesthesia, can cause constipation. Drinking plenty of liquids and increasing fiber intake will help.      For any questions of a medical nature, call your surgeon.      Do not make important decisions for 24 hours.      If you had general anesthesia, you may have a sore throat for a couple of days related to the breathing tube used during surgery.  You may use Cepacol lozenges to help with this discomfort.  If it worsens or if you develop a fever, contact your surgeon.       If you feel your pain is not well managed with the pain medications prescribed by your surgeon, please contact your surgeon's office to let them know so they can address your concerns.             **If you have questions or concerns about your procedure,   call Dr. Garner at [phone]**    One tramadol taken at 10:42 for pain.

## 2018-09-21 NOTE — IP AVS SNAPSHOT
[facility] Day [facility]    [address]    [place] [address]    Phone:  [phone]    Fax:  [phone]                                       After Visit Summary   9/21/2018    Binta Dennis    MRN: [id number]           After Visit Summary Signature Page     I have received my discharge instructions, and my questions have been answered. I have discussed any challenges I see with this plan with the nurse or doctor.    ..........................................................................................................................................  Patient/Patient Representative Signature      ..........................................................................................................................................  Patient Representative Print Name and Relationship to Patient    ..................................................               ................................................  Date                                   Time    ..........................................................................................................................................  Reviewed by Signature/Title    ...................................................              ..............................................  Date                                               Time          22EPIC Rev 08/18

## 2018-09-21 NOTE — OP NOTE
PREOPERATIVE DIAGNOSIS: Ptosis, bilateral upper lid.   POSTOPERATIVE DIAGNOSIS:  Ptosis,bilateral upper lid.   PROCEDURE:Both  upper eyelid  ptosis repair by external levator resection.   SURGEON: Michelle Garner MD  ASSISTANT: Talita Lucas MD  ANESTHESIA: Monitored with local infiltration of a 50/50 mixture of 2% lidocaine with epinephrine and 0.5% Marcaine.   COMPLICATIONS: None.   ESTIMATED BLOOD LOSS: Less than 5 mL.   HISTORY: Binta Dennis  presented with ptosis of bilateral upper lid interfering with the superior visual field and activities of daily living. After the risks, benefits and alternatives to the proposed procedure were explained, informed consent was obtained.   DESCRIPTION OF PROCEDURE: Binta Dennis  was brought to the operating room and placed supine on the operating table. Intravenous sedation was given. The bilateral upper lid crease and an ellipse of skin was marked with a marking pen and infiltrated with local anesthetic. The area was prepped and draped in the typical sterile ophthalmic fashion. Attention was directed to the left  side. The previously marked ellipse was incised with a 15 blade and the skin flap was excised with high temperature cautery. The orbital septum was opened horizontally. The levator aponeurosis was identified and dissected from the superior tarsal border and the underlying Matamoros's muscle and advanced with a double armed 5-0 Mersilene suture to bring the lid into a normal height and contour. The suture was passed to partial thickness through the superior tarsal plate and then each end brought underneath the levator aponeurosis. Then nasal and central fat pads were conservatively debulked with monopolar cautery. The patient was asked to open the eye and the suture adjusted for height and contour. Attention was directed to the other side where the same procedure was performed. The skin was closed with with running 6-0 plain gut sutures.   The patient tolerated  the procedure well and left the operating room in stable condition.     Michelle Garner MD

## 2018-09-21 NOTE — ANESTHESIA POSTPROCEDURE EVALUATION
Patient: Binta Dennis    Procedure(s):  BILATERAL UPPER LID PTOSIS REPAIR  - Wound Class: I-Clean    Diagnosis:dermatochalisis, bilateral upper lids   Diagnosis Additional Information: No value filed.    Anesthesia Type:  MAC    Note:  Anesthesia Post Evaluation    Patient location during evaluation: Phase 2  Patient participation: Able to fully participate in evaluation  Level of consciousness: awake and alert  Pain management: adequate  Airway patency: patent  Cardiovascular status: acceptable  Respiratory status: acceptable  Hydration status: acceptable  PONV: none     Anesthetic complications: None          Last vitals:  Vitals:    09/21/18 1020 09/21/18 1030 09/21/18 1040   BP:  (!) 88/50 100/47   Pulse:      Resp: 14 15 15   Temp:      SpO2: 93% 94% 94%         Electronically Signed By: Beryl Nagy MD  September 21, 2018  12:57 PM

## 2018-09-21 NOTE — ANESTHESIA PREPROCEDURE EVALUATION
Procedure: Procedure(s):  REPAIR PTOSIS BILATERAL  Preop diagnosis: dermatochalisis, bilateral upper lids     Allergies   Allergen Reactions     Mold      Penicillins      Seasonal Allergies      Mosquitoes (Informational Only) Swelling     Past Medical History:   Diagnosis Date     Colon polyps      CPAP (continuous positive airway pressure) dependence      Depression      Diabetes (H)      Droopy eyelid, bilateral      Gout      Hx of neck surgery     disk repair     Hyperlipidaemia      Hypertension      Hypertension      IDDM (insulin dependent diabetes mellitus) (H)      ELI (obstructive sleep apnea)      Osteoarthritis      Psoriasis      Sleep apnea     uses C-Pap     Type 2 diabetes mellitus (H)     insulin user     Past Surgical History:   Procedure Laterality Date     BACK SURGERY      disk removal     CATARACT IOL, RT/LT Right 8-17-15     DECOMPRESSION CERVICAL MINIMALLY INVASIVE ONE LEVEL      levels unknown     EYE SURGERY       HEAD & NECK SURGERY      c- spine     KNEE SURGERY Bilateral      lumbar disectomy       ORTHOPEDIC SURGERY       PHACOEMULSIFICATION CLEAR CORNEA WITH STANDARD INTRAOCULAR LENS IMPLANT Right 8/17/2015    Procedure: PHACOEMULSIFICATION CLEAR CORNEA WITH STANDARD INTRAOCULAR LENS IMPLANT;  Surgeon: Addy Razo MD;  Location: [facility]     PHACOEMULSIFICATION CLEAR CORNEA WITH STANDARD INTRAOCULAR LENS IMPLANT Left 5/16/2016    Procedure: PHACOEMULSIFICATION CLEAR CORNEA WITH STANDARD INTRAOCULAR LENS IMPLANT;  Surgeon: Addy Razo MD;  Location: [facility]     STRABISMUS SURGERY Bilateral      TOE SURGERY       TONSILLECTOMY       Prior to Admission medications    Medication Sig Start Date End Date Taking? Authorizing Provider   ACETAMINOPHEN PO Take by mouth as needed    Reported, Patient   allopurinol (ZYLOPRIM) 300 MG tablet Take 300 mg by mouth daily    Reported, Patient   ARTIFICIAL TEAR OP Apply 1 drop to eye daily     Reported, Patient   hydrochlorothiazide  (MICROZIDE) 12.5 MG capsule Take 25 mg by mouth daily     Reported, Patient   Insulin Detemir (LEVEMIR FLEXPEN SC) Inject 39 Units Subcutaneous daily     Reported, Patient   lisinopril (PRINIVIL,ZESTRIL) 10 MG tablet Take 10 mg by mouth daily    Reported, Patient   metFORMIN (GLUCOPHAGE) 1000 MG tablet Take 1,000 mg by mouth 2 times daily (with meals)    Reported, Patient   prednisoLONE acetate (PRED FORTE) 1 % ophthalmic suspension Operated eye    Start the day after surgery 1 drop four times a day for 1 week, then three times a day for 1 week, then twice a day for 1 week, then once a day for 1 week, then stop 3/24/16   Addy Razo MD   SIMVASTATIN PO Take 10 mg by mouth At Bedtime    Reported, Patient   VITAMIN D, CHOLECALCIFEROL, PO Take by mouth daily    Reported, Patient     No current Epic-ordered facility-administered medications on file.      No current Epic-ordered outpatient prescriptions on file.     Wt Readings from Last 1 Encounters:   05/29/18 140.6 kg (310 lb)     Temp Readings from Last 1 Encounters:   05/16/16 35.9  C (96.6  F) (Temporal)     BP Readings from Last 6 Encounters:   05/16/16 95/54   08/17/15 126/69     Pulse Readings from Last 4 Encounters:   08/17/15 87     Resp Readings from Last 1 Encounters:   05/16/16 14     SpO2 Readings from Last 1 Encounters:   05/16/16 98%      RECENT LABS:   ECG:   ECHO:   CXR:        Anesthesia Evaluation     .             ROS/MED HX    ENT/Pulmonary:     (+)sleep apnea, , . .   (-) tobacco use, asthma and COPD   Neurologic:      (-) seizures, CVA and migraines   Cardiovascular:     (+) Dyslipidemia, hypertension----. : . . . :. .      (-) CAD, KOROMA, arrhythmias and valvular problems/murmurs   METS/Exercise Tolerance:  >4 METS   Hematologic:        (-) history of blood clots, anemia and other hematologic disorder   Musculoskeletal:   (+) arthritis, , , other musculoskeletal (gout)-       GI/Hepatic:        (-) GERD and liver disease    Renal/Genitourinary:      (-) renal disease and nephrolithiasis   Endo:     (+) type II DM Obesity, .   (-) Type I DM and thyroid disease   Psychiatric:  - neg psychiatric ROS       Infectious Disease:        (-) Recent Fever   Malignancy:         Other:                     Physical Exam  Normal systems: cardiovascular, pulmonary and dental    Airway   Mallampati: II  TM distance: >3 FB  Neck ROM: full    Dental     Cardiovascular   Rhythm and rate: regular and normal  (-) no murmur    Pulmonary    breath sounds clear to auscultation                    Anesthesia Plan      History & Physical Review      ASA Status:  3 .    NPO Status:  > 8 hours    Plan for MAC Reason for MAC:  Deep or markedly invasive procedure (G8)  PONV prophylaxis:  Ondansetron (or other 5HT-3)       Postoperative Care  Postoperative pain management:  Multi-modal analgesia.      Consents  Anesthetic plan, risks, benefits and alternatives discussed with:  Patient..

## 2018-09-21 NOTE — ANESTHESIA CARE TRANSFER NOTE
Patient: Binta Dennis    Procedure(s):  BILATERAL UPPER LID PTOSIS REPAIR  - Wound Class: I-Clean    Diagnosis: dermatochalisis, bilateral upper lids   Diagnosis Additional Information: No value filed.    Anesthesia Type:   MAC     Note:  Airway :Room Air  Patient transferred to:PACU  Comments: Pt exhibits spont resps, all monitors and alarms on in pacu, report given to RN, vss.Handoff Report: Identifed the Patient, Identified the Reponsible Provider, Reviewed the pertinent medical history, Discussed the surgical course, Reviewed Intra-OP anesthesia mangement and issues during anesthesia, Set expectations for post-procedure period and Allowed opportunity for questions and acknowledgement of understanding      Vitals: (Last set prior to Anesthesia Care Transfer)    CRNA VITALS  9/21/2018 0846 - 9/21/2018 0924      9/21/2018             NIBP: 95/50    NIBP Mean: 68    Ht Rate: 79    SpO2: 93 %    Resp Rate (set): 10                Electronically Signed By: RHONDA Rodriguez CRNA  September 21, 2018  9:24 AM

## 2018-09-21 NOTE — IP AVS SNAPSHOT
MRN:[id number]                      After Visit Summary   9/21/2018    Binta Dennis    MRN: [id number]           Thank you!     Thank you for choosing [facility] for your care. Our goal is always to provide you with excellent care. Hearing back from our patients is one way we can continue to improve our services. Please take a few minutes to complete the written survey that you may receive in the mail after you visit with us. Thank you!        Patient Information     Date Of Birth          1950        About your hospital stay     You were admitted on:  September 21, 2018 [facility]    You were discharged on:  September 21, 2018       Who to Call     For medical emergencies, please call 911.  For non-urgent questions about your medical care, please call your primary care provider or clinic, [phone]  For questions related to your surgery, please call your surgery clinic        Attending Provider     Provider Michelle Anderson MD Ophthalmology       Primary Care Provider Office Phone # Fax #    Mike Bahena [phone]      After Care Instructions     Discharge Medication Instructions       Do NOT take aspirin or medications containing NSAIDS for 72 hours after procedure.            Ice to affected area       Apply cold pack for 15 minutes on, 15 minutes off, for 48 hours while awake.                  Your next 10 appointments already scheduled     Oct 02, 2018 11:00 AM CDT   (Arrive by 10:45 AM)   Post-Op with Michelle Garner MD   [facility] ([facility])    [address] [place] [id number] [phone]              Further instructions from your care team       Post-operative Instructions  Ophthalmic Plastic and Reconstructive Surgery    Michelle Garner M.D.     All instructions apply to the operated eye(s) or eyelid(s).    Wound  care and personal care  ? If a patch or bandage has been placed, please leave this in place until seen by your physician. Ensure that the bandage does not get wet when you take a shower.  ? Apply ice compresses 15 minutes on 15 minutes off while awake for 2 days, then switch to warm water compresses 4 times a day until seen by your physician. For warm packs you can place a cup of dry uncooked rice in a clean cotton sock. Then place sock in microwave 30 seconds to one minute. Next place the warm sock into a plastic bag and wrap the bag with clean warm wet washcloth and place over operated eye.    ? You may shower or wash your hair the day after surgery. Do not bathe or go swimming for 1 week to prevent contamination of your wounds.  ? Do not apply make-up to the eyes or eyelids for 2 weeks after surgery.  ? Expect some swelling, bruising, black eye (even into the lower eyelids and cheeks). Also expect serum caking, crusting and discharge from the eye and/or incisions. A small amount of surface bleeding is normal for the first 48 hours.  ? Your eye(s) and eyelid(s) may be painful and tender. This is normal after surgery.      Contact information and follow-up  ? Return to the Eye Clinic for a follow-up appointment with your physician as  scheduled. If no appointment has been scheduled:   - [facility] eye clinic: [phone] for an appointment with Dr. Garner within 1 to 2 weeks from your date of surgery.   -  [facility] eye clinic: [phone] for an appointment with Dr. Garner within 1 to 2 weeks from your date of surgery.     ? For severe pain, bleeding, or loss of vision, call the [facility] at [phone] or [facility] at [phone].     After hours or on weekends and holidays, call [phone] and ask to speak with the ophthalmologist on call.    An on call person can be reached after hours for concerns. The on call doctor should not  call in medication refill requests after hours or on weekends, so please plan accordingly. An effort has been made to provide adequate pain medications following every surgery, and refills will not be provided in most instances. Narcotic pain medications cannot be called in.     Activity restrictions and driving  ? Avoid heavy lifting, bending, exercise or strenuous activity for 1 week after surgery.  You may resume other activities and return to work as tolerated.  ? You may not resume driving until have you stopped using narcotic pain medications (such as Norco, Percocet, Tylenol #3).    Medications  ? Restart all your regular home medications and eye drops. If you take Plavix or  Aspirin on a regular basis, wait for 72 hours after your surgery before restarting these in order to decrease the risk of bleeding complications.  ? Avoid aspirin and aspirin-like medications (Motrin, Aleve, Ibuprofen, Nasra-  [place] etc) for 72 hours to reduce the risk of bleeding. You may take Tylenol  (acetaminophen) for pain.  ? In addition to your home medications, take the following post-operative medications as prescribed by your physician.    ? Apply antibiotic ointment to all sutures three times a day, and into the operated eye(s) at night.    ? WARNING:  You must not take more than 4,000 mg of acetaminophen per  24-hour period.       Same Day Surgery Discharge Instructions for  Sedation and General Anesthesia       It's not unusual to feel dizzy, light-headed or faint for up to 24 hours after surgery or while taking pain medication.  If you have these symptoms: sit for a few minutes before standing and have someone assist you when you get up to walk or use the bathroom.      You should rest and relax for the next 24 hours. We recommend you make arrangements to have an adult stay with you for at least 24 hours after your discharge.  Avoid hazardous and strenuous activity.      DO NOT DRIVE any vehicle or operate mechanical  "equipment for 24 hours following the end of your surgery.  Even though you may feel normal, your reactions may be affected by the medication you have received.      Do not drink alcoholic beverages for 24 hours following surgery.       Slowly progress to your regular diet as you feel able. It's not unusual to feel nauseated and/or vomit after receiving anesthesia.  If you develop these symptoms, drink clear liquids (apple juice, ginger ale, broth, 7-up, etc. ) until you feel better.  If your nausea and vomiting persists for 24 hours, please notify your surgeon.        All narcotic pain medications, along with inactivity and anesthesia, can cause constipation. Drinking plenty of liquids and increasing fiber intake will help.      For any questions of a medical nature, call your surgeon.      Do not make important decisions for 24 hours.      If you had general anesthesia, you may have a sore throat for a couple of days related to the breathing tube used during surgery.  You may use Cepacol lozenges to help with this discomfort.  If it worsens or if you develop a fever, contact your surgeon.       If you feel your pain is not well managed with the pain medications prescribed by your surgeon, please contact your surgeon's office to let them know so they can address your concerns.             **If you have questions or concerns about your procedure,   call Dr. Garner at [phone]**    One tramadol taken at 10:42 for pain.    Pending Results     No orders found from 9/19/2018 to 9/22/2018.            Admission Information     Date & Time Provider Department Dept. Phone    9/21/2018 Michelle Garner MD [facility] Same Day Surgery [phone]      Your Vitals Were     Blood Pressure Pulse Temperature Respirations Height Weight    100/47 83 97.2  F (36.2  C) 15 1.626 m (5' 4\") 146.1 kg (322 lb)    Pulse Oximetry BMI (Body Mass Index)                94% 55.27 kg/m2          MyChart Information     MyChart " "lets you send messages to your doctor, view your test results, renew your prescriptions, schedule appointments and more. To sign up, go to [email or web address] . Click on \"Log in\" on the left side of the screen, which will take you to the Welcome page. Then click on \"Sign up Now\" on the right side of the page.     You will be asked to enter the access code listed below, as well as some personal information. Please follow the directions to create your username and password.     Your access code is: [email or web address]  Expires: 2018  6:32 AM     Your access code will  in 90 days. If you need help or a new code, please call your [facility] clinic or [phone].        Care EveryWhere ID     This is your Care EveryWhere ID. This could be used by other organizations to access your [facility] medical records  [id number]        Equal Access to Services     LYLE MANCUSO : Valdez Cox, cb collazo, oswaldo govea, iqra ambrose . So [facility] [phone].    ATENCIÓN: Si habla español, tiene a garcia disposición servicios gratuitos de asistencia lingüística. Llame al [phone].    We comply with applicable federal civil rights laws and [place] laws. We do not discriminate on the basis of race, color, national origin, age, disability, sex, sexual orientation, or gender identity.               Review of your medicines      START taking        Dose / Directions    erythromycin ophthalmic ointment   Commonly known as:  ROMYCIN   Used for:  Postoperative eye state        Dose:  0.5 inch   Apply 0.5 inches to eye 3 times daily for 7 days Apply small amount to incision sites, as directed per physician instructions.   Quantity:  3.5 g   Refills:  0         CONTINUE these medicines which have NOT CHANGED        Dose / Directions    ACETAMINOPHEN PO        Take by mouth as needed   Refills:  0       allopurinol 300 MG tablet   Commonly known as:  ZYLOPRIM        Dose:  " 300 mg   Take 300 mg by mouth daily   Refills:  0       ARTIFICIAL TEAR OP        Dose:  1 drop   Apply 1 drop to eye daily   Refills:  0       hydrochlorothiazide 12.5 MG capsule   Commonly known as:  MICROZIDE        Dose:  25 mg   Take 25 mg by mouth daily   Refills:  0       LEVEMIR FLEXPEN SC        Dose:  39 Units   Inject 39 Units Subcutaneous daily   Refills:  0       lisinopril 10 MG tablet   Commonly known as:  PRINIVIL/ZESTRIL        Dose:  10 mg   Take 10 mg by mouth daily   Refills:  0       metFORMIN 1000 MG tablet   Commonly known as:  GLUCOPHAGE        Dose:  1000 mg   Take 1,000 mg by mouth 2 times daily (with meals)   Refills:  0       prednisoLONE acetate 1 % ophthalmic susp   Commonly known as:  PRED FORTE   Used for:  Cataract        Operated eye  Start the day after surgery 1 drop four times a day for 1 week, then three times a day for 1 week, then twice a day for 1 week, then once a day for 1 week, then stop   Quantity:  1 Bottle   Refills:  1       SIMVASTATIN PO        Dose:  10 mg   Take 10 mg by mouth At Bedtime   Refills:  0       VITAMIN D (CHOLECALCIFEROL) PO        Take by mouth daily   Refills:  0            Where to get your medicines      Some of these will need a paper prescription and others can be bought over the counter. Ask your nurse if you have questions.     Bring a paper prescription for each of these medications     erythromycin ophthalmic ointment                Protect others around you: Learn how to safely use, store and throw away your medicines at www.disposemymeds.org.             Medication List: This is a list of all your medications and when to take them. Check marks below indicate your daily home schedule. Keep this list as a reference.      Medications           Morning Afternoon Evening Bedtime As Needed    ACETAMINOPHEN PO   Take by mouth as needed                                allopurinol 300 MG tablet   Commonly known as:  ZYLOPRIM   Take 300 mg by mouth  daily                                ARTIFICIAL TEAR OP   Apply 1 drop to eye daily                                erythromycin ophthalmic ointment   Commonly known as:  ROMYCIN   Apply 0.5 inches to eye 3 times daily for 7 days Apply small amount to incision sites, as directed per physician instructions.   Last time this was given:  1 g on 9/21/2018  8:52 AM                                hydrochlorothiazide 12.5 MG capsule   Commonly known as:  MICROZIDE   Take 25 mg by mouth daily                                LEVEMIR FLEXPEN SC   Inject 39 Units Subcutaneous daily                                lisinopril 10 MG tablet   Commonly known as:  PRINIVIL/ZESTRIL   Take 10 mg by mouth daily                                metFORMIN 1000 MG tablet   Commonly known as:  GLUCOPHAGE   Take 1,000 mg by mouth 2 times daily (with meals)                                prednisoLONE acetate 1 % ophthalmic susp   Commonly known as:  PRED FORTE   Operated eye  Start the day after surgery 1 drop four times a day for 1 week, then three times a day for 1 week, then twice a day for 1 week, then once a day for 1 week, then stop                                SIMVASTATIN PO   Take 10 mg by mouth At Bedtime                                VITAMIN D (CHOLECALCIFEROL) PO   Take by mouth daily

## 2018-09-21 NOTE — BRIEF OP NOTE
[facility]    Brief Operative Note    Pre-operative diagnosis: dermatochalisis, bilateral upper lids   Post-operative diagnosis * No post-op diagnosis entered *  Procedure: Procedure(s):  BILATERAL UPPER LID PTOSIS REPAIR  - Wound Class: I-Clean  Surgeon: Surgeon(s) and Role:     * Michelle Garner MD - Primary  Anesthesia: Monitor Anesthesia Care   Estimated blood loss: Minimal  Drains: None  Specimens: * No specimens in log *  Findings:   None.  Complications: None.  Implants: None.

## 2018-10-02 ENCOUNTER — OFFICE VISIT (OUTPATIENT)
Dept: OPHTHALMOLOGY | Facility: CLINIC | Age: 68
End: 2018-10-02
Payer: COMMERCIAL

## 2018-10-02 DIAGNOSIS — H02.835 DERMATOCHALASIS OF BOTH LOWER EYELIDS: Primary | ICD-10-CM

## 2018-10-02 DIAGNOSIS — H02.832 DERMATOCHALASIS OF BOTH LOWER EYELIDS: Primary | ICD-10-CM

## 2018-10-02 ASSESSMENT — VISUAL ACUITY
OS_SC+: -1
OS_SC: 20/20
OD_SC: 20/20
METHOD: SNELLEN - LINEAR

## 2018-10-02 ASSESSMENT — TONOMETRY
IOP_METHOD: ICARE
OD_IOP_MMHG: 19
OS_IOP_MMHG: 19

## 2018-10-02 NOTE — MR AVS SNAPSHOT
After Visit Summary   10/2/2018    Binta Dennis    MRN: [id number]           Patient Information     Date Of Birth          1950        Visit Information        Provider Department      10/2/2018 11:00 AM Michelle Garner MD  [facility]        Today's Diagnoses     Dermatochalasis of both lower eyelids    -  1       Follow-ups after your visit        Follow-up notes from your care team     Return in about 2 months (around 2018).      Your next 10 appointments already scheduled     Dec 04, 2018  9:30 AM CST   (Arrive by 9:15 AM)   Post-Op with MD LANI [facility] ([facility] [place])    [address] [address] [phone]              Who to contact     Please call your clinic at [phone] to:    Ask questions about your health    Make or cancel appointments    Discuss your medicines    Learn about your test results    Speak to your doctor            Additional Information About Your Visit        MyChart Information     [employer] is an electronic gateway that provides easy, online access to your medical records. With [employer], you can request a clinic appointment, read your test results, renew a prescription or communicate with your care team.     To sign up for [employer] visit the website at [email or web address]   You will be asked to enter the access code listed below, as well as some personal information. Please follow the directions to create your username and password.     Your access code is: [email or web address]  Expires: 2018  6:32 AM     Your access code will  in 90 days. If you need help or a new code, please contact your [facility] or call [phone] for assistance.        Care EveryWhere ID     This is your Care EveryWhere ID. This could be used by other organizations to access your [place] medical records  [id number]         Blood Pressure  from Last 3 Encounters:   09/21/18 100/47   05/16/16 95/54   08/17/15 126/69    Weight from Last 3 Encounters:   09/21/18 146.1 kg (322 lb)   05/29/18 140.6 kg (310 lb)   05/13/16 (!) 144.7 kg (319 lb)              Today, you had the following     No orders found for display       Primary Care Provider Office Phone # Fax #    Mike Bahena [phone]       [facility] [address] [facility] [address]  [facility] [address]        Equal Access to Services     [facility]: Hadii aad ku hadasho Soomaali, waaxda luqadaha, qaybta kaalmada adetashiayabeltran, iqra ambrose . So [facility] [phone].    ATENCIÓN: Si habla español, tiene a garcia disposición servicios gratuitos de asistencia lingüística. Llame al [phone].    We comply with applicable federal civil rights laws and [place] laws. We do not discriminate on the basis of race, color, national origin, age, disability, sex, sexual orientation, or gender identity.            Thank you!     Thank you for choosing [facility] OPHTHALMOLOGY  for your care. Our goal is always to provide you with excellent care. Hearing back from our patients is one way we can continue to improve our services. Please take a few minutes to complete the written survey that you may receive in the mail after your visit with us. Thank you!             Your Updated Medication List - Protect others around you: Learn how to safely use, store and throw away your medicines at www.disposemymeds.org.          This list is accurate as of 10/2/18 11:27 AM.  Always use your most recent med list.                   Brand Name Dispense Instructions for use Diagnosis    ACETAMINOPHEN PO      Take by mouth as needed        allopurinol 300 MG tablet    ZYLOPRIM     Take 300 mg by mouth daily        ARTIFICIAL TEAR OP      Apply 1 drop to eye daily        hydrochlorothiazide 12.5 MG capsule    MICROZIDE     Take 25 mg by mouth daily        LEVEMIR FLEXPEN SC      Inject 39  Units Subcutaneous daily        lisinopril 10 MG tablet    PRINIVIL/ZESTRIL     Take 10 mg by mouth daily        metFORMIN 1000 MG tablet    GLUCOPHAGE     Take 1,000 mg by mouth 2 times daily (with meals)        prednisoLONE acetate 1 % ophthalmic susp    PRED FORTE    1 Bottle    Operated eye  Start the day after surgery 1 drop four times a day for 1 week, then three times a day for 1 week, then twice a day for 1 week, then once a day for 1 week, then stop    Cataract       SIMVASTATIN PO      Take 10 mg by mouth At Bedtime        VITAMIN D (CHOLECALCIFEROL) PO      Take by mouth daily

## 2018-10-02 NOTE — NURSING NOTE
"Chief Complaints and History of Present Illnesses   Patient presents with     Post Op (Ophthalmology) Both Eyes     POD#11 s/p Both  upper eyelid  ptosis repair by external levator resection     HPI    Affected eye(s):  Both   Symptoms:     No blurred vision   No floaters   No flashes   No tearing   No itching   No burning   No eye discharge         Do you have eye pain now?:  No      Comments:    Patient notes that her RUL is a little tender, feels that the LE is more healed than the RE, \"worried that there are bumps on the corners of my eyes\" lydia Le October 2, 2018 11:04 AM               "

## 2018-10-02 NOTE — PROGRESS NOTES
Binta Dennis is status post bilateral levator advancement ptosis repair.  Incision(s) healing well.  The lid(s)  are  in excellent position.  Very pleased with improvement in her peripheral vision.   I have recommended:  * Continue antibiotic ointment or bland lubricating ointment (eg vaseline or aquaphor) to the incision site qhs.     * Warm soaks 3-4x daily until all edema and ecchymoses resolve  * Return to clinic in 2 months     Attending Physician Attestation:  Complete documentation of historical and exam elements from today's encounter can be found in the full encounter summary report (not reduplicated in this progress note).  I personally obtained the chief complaint(s) and history of present illness.  I confirmed and edited as necessary the review of systems, past medical/surgical history, family history, social history, and examination findings as documented by others; and I examined the patient myself.  I personally reviewed the relevant tests, images, and reports as documented above.  I formulated and edited as necessary the assessment and plan and discussed the findings and management plan with the patient and family. - Michelle Garner MD

## 2018-12-04 ENCOUNTER — OFFICE VISIT (OUTPATIENT)
Dept: OPHTHALMOLOGY | Facility: CLINIC | Age: 68
End: 2018-12-04
Payer: COMMERCIAL

## 2018-12-04 DIAGNOSIS — Z98.890 POSTOPERATIVE EYE STATE: Primary | ICD-10-CM

## 2018-12-04 DIAGNOSIS — H02.403 PTOSIS OF EYELID, BILATERAL: ICD-10-CM

## 2018-12-04 ASSESSMENT — TONOMETRY
OS_IOP_MMHG: 15
IOP_METHOD: ICARE
OD_IOP_MMHG: 19

## 2018-12-04 ASSESSMENT — VISUAL ACUITY
OD_SC+: -3
OD_SC: 20/25
OS_SC: 20/25
OS_SC+: +1
METHOD: SNELLEN - LINEAR

## 2018-12-04 ASSESSMENT — EXTERNAL EXAM - RIGHT EYE: OD_EXAM: NORMAL

## 2018-12-04 ASSESSMENT — EXTERNAL EXAM - LEFT EYE: OS_EXAM: NORMAL

## 2018-12-04 NOTE — PROGRESS NOTES
"       Chief Complaints and History of Present Illnesses   Patient presents with     Post Op (Ophthalmology) Both Eyes     Both  upper eyelid  ptosis repair by external levator resection.      S/p bilateral levator advancement ptosis repair on 9/21/18. Doing well, happy with results. Notes some \"bumps\" of left upper eyelid. No further complaints.    Assessment & Plan     Binta Dennis is a 68 year old female with the following diagnoses:   1. Postoperative eye state    2. Ptosis of eyelid, bilateral       S/p bilateral levator advancement ptosis repair on 9/21/18  - Doing well, incisions healed, happy with results  - F/u in oculoplastics clinic PRN    Mando Syed MD  Ophthalmology Resident, PGY-2  Looks great, very happy with improvement in peripheral vision.  Had dry eye preop and seems unchanged  Few suture bumps left upper lid, minimal. Observe, if fail to resolve in 6 months return to clinic and can excise.     Attending Physician Attestation:  Complete documentation of historical and exam elements from today's encounter can be found in the full encounter summary report (not reduplicated in this progress note).  I personally obtained the chief complaint(s) and history of present illness.  I confirmed and edited as necessary the review of systems, past medical/surgical history, family history, social history, and examination findings as documented by others; and I examined the patient myself.  I personally reviewed the relevant tests, images, and reports as documented above.  I formulated and edited as necessary the assessment and plan and discussed the findings and management plan with the patient and family. - Michelle Garner MD        "

## 2018-12-04 NOTE — MR AVS SNAPSHOT
After Visit Summary   12/4/2018    Binta Dennis    MRN: [id number]           Patient Information     Date Of Birth          1950        Visit Information        Provider Department      12/4/2018 9:30 AM Michelle Garner MD [facility]        Today's Diagnoses     Postoperative eye state    -  1    Ptosis of eyelid, bilateral           Follow-ups after your visit        Who to contact     Please call your clinic at [phone] to:    Ask questions about your health    Make or cancel appointments    Discuss your medicines    Learn about your test results    Speak to your doctor            Additional Information About Your Visit        Care EveryWhere ID     This is your Care EveryWhere ID. This could be used by other organizations to access your [place] medical records  [id number]         Blood Pressure from Last 3 Encounters:   09/21/18 100/47   05/16/16 95/54   08/17/15 126/69    Weight from Last 3 Encounters:   09/21/18 146.1 kg (322 lb)   05/29/18 140.6 kg (310 lb)   05/13/16 (!) 144.7 kg (319 lb)              Today, you had the following     No orders found for display         Today's Medication Changes          These changes are accurate as of 12/4/18 10:00 AM.  If you have any questions, ask your nurse or doctor.               Stop taking these medicines if you haven't already. Please contact your care team if you have questions.     prednisoLONE acetate 1 % ophthalmic suspension   Commonly known as:  PRED FORTE   Stopped by:  Michelle Garner MD                    Primary Care Provider Office Phone # Fax #    Mike ZEPEDA MD Josef [phone]       [facility] [address] [place] [address]        Equal Access to Services     [facility]: Hadii darion Cox, waaxda luqnancy, qaybta iqra mcclure. [facility] [phone].    ATENCIÓN: Si sulemanla español, tiene a garcia disposición  servicios gratuitos de asistencia lingüística. Dominguez lara [phone].    We comply with applicable federal civil rights laws and [place] laws. We do not discriminate on the basis of race, color, national origin, age, disability, sex, sexual orientation, or gender identity.            Thank you!     Thank you for choosing [facility] OPHTHALMOLOGY  for your care. Our goal is always to provide you with excellent care. Hearing back from our patients is one way we can continue to improve our services. Please take a few minutes to complete the written survey that you may receive in the mail after your visit with us. Thank you!             Your Updated Medication List - Protect others around you: Learn how to safely use, store and throw away your medicines at www.disposemymeds.org.          This list is accurate as of 12/4/18 10:00 AM.  Always use your most recent med list.                   Brand Name Dispense Instructions for use Diagnosis    ACETAMINOPHEN PO      Take by mouth as needed        allopurinol 300 MG tablet    ZYLOPRIM     Take 300 mg by mouth daily        ARTIFICIAL TEAR OP      Apply 1 drop to eye daily        hydrochlorothiazide 12.5 MG capsule    MICROZIDE     Take 25 mg by mouth daily        LEVEMIR FLEXPEN SC      Inject 39 Units Subcutaneous daily        lisinopril 10 MG tablet    PRINIVIL/ZESTRIL     Take 10 mg by mouth daily        metFORMIN 1000 MG tablet    GLUCOPHAGE     Take 1,000 mg by mouth 2 times daily (with meals)        SIMVASTATIN PO      Take 10 mg by mouth At Bedtime        VITAMIN D (CHOLECALCIFEROL) PO      Take by mouth daily

## 2018-12-04 NOTE — NURSING NOTE
Chief Complaints and History of Present Illnesses   Patient presents with     Post Op (Ophthalmology) Both Eyes     Both  upper eyelid  ptosis repair by external levator resection.      HPI    Affected eye(s):  Both   Symptoms:     No blurred vision      Frequency:  Constant       Do you have eye pain now?:  No      Comments:  3 month postop s/p Both  upper eyelid  ptosis repair by external levator resection on 9/21/2018. Pt feels like she is not healing well, some tenderness on the right upper eyelid. Pt also concerned of the the bumps on the left upperlid. Pt is happy that the eyelids are no longer in the way. AT PRN each eye. Vaseline BID each eye.    Elaina Cortez COT 9:23 AM December 4, 2018

## 2019-07-20 NOTE — MR AVS SNAPSHOT
After Visit Summary   2018    Binta Dennis    MRN: [id number]           Patient Information     Date Of Birth          1950        Visit Information        Provider Department      2018 1:15 PM Michelle Garner MD [facility]        Today's Diagnoses     Floppy eyelid syndrome    -  1    Dermatochalasis of eyelids of both eyes        Acquired involutional ptosis of both eyelids           Follow-ups after your visit        Your next 10 appointments already scheduled     May 29, 2018  1:15 PM CDT   (Arrive by 1:00 PM)   NEW PLASTICS with MD LANI [facility] ([facility] [place])    [address] [address] [phone]              Who to contact     Please call your clinic at [phone] to:    Ask questions about your health    Make or cancel appointments    Discuss your medicines    Learn about your test results    Speak to your doctor            Additional Information About Your Visit        MyChart Information     [employer] is an electronic gateway that provides easy, online access to your medical records. With [employer], you can request a clinic appointment, read your test results, renew a prescription or communicate with your care team.     To sign up for [employer] visit the website at [email or web address]   You will be asked to enter the access code listed below, as well as some personal information. Please follow the directions to create your username and password.     Your access code is: [email or web address]  Expires: 2018  6:30 AM     Your access code will  in 90 days. If you need help or a new code, please contact your [facility] or call [phone] for assistance.        Care EveryWhere ID     This is your Care EveryWhere ID. This could be used by other organizations to access your [place] medical records  [id number]        Your Vitals Were      "Height BMI (Body Mass Index)                1.626 m (5' 4\") 53.21 kg/m2           Blood Pressure from Last 3 Encounters:   05/16/16 95/54   08/17/15 126/69    Weight from Last 3 Encounters:   05/29/18 140.6 kg (310 lb)   05/13/16 (!) 144.7 kg (319 lb)   08/14/15 (!) 145.2 kg (320 lb)              We Performed the Following     External Photos OU (both eyes)     Hernandes VF Ptosis OU     Eri-Operative Worksheet (Plastics)        Primary Care Provider Office Phone # Fax #    Mike Bahena [phone]       [facility] [address] [facility] [address]  [facility] [address]        Equal Access to Services     LYLE MANCUSO : Valdez mercadoo Sobhanu, waaxda luqadaha, qaybta kaalmada adeegyada, iqra ambrose . So [facility] [phone].    ATENCIÓN: Si habla español, tiene a garcia disposición servicios gratuitos de asistencia lingüística. [facility] [phone].    We comply with applicable federal civil rights laws and [place] laws. We do not discriminate on the basis of race, color, national origin, age, disability, sex, sexual orientation, or gender identity.            Thank you!     Thank you for choosing [facility] OPHTHALMOLOGY  for your care. Our goal is always to provide you with excellent care. Hearing back from our patients is one way we can continue to improve our services. Please take a few minutes to complete the written survey that you may receive in the mail after your visit with us. Thank you!             Your Updated Medication List - Protect others around you: Learn how to safely use, store and throw away your medicines at www.disposemymeds.org.          This list is accurate as of 5/29/18  1:10 PM.  Always use your most recent med list.                   Brand Name Dispense Instructions for use Diagnosis    ACETAMINOPHEN PO      Take by mouth as needed        allopurinol 300 MG tablet    ZYLOPRIM     Take 300 mg by mouth daily        ARTIFICIAL TEAR OP      Apply " 1 drop to eye daily        hydrochlorothiazide 12.5 MG capsule    MICROZIDE     Take 25 mg by mouth daily        LEVEMIR FLEXPEN SC      Inject 39 Units Subcutaneous daily        lisinopril 10 MG tablet    PRINIVIL/ZESTRIL     Take 10 mg by mouth daily        metFORMIN 1000 MG tablet    GLUCOPHAGE     Take 1,000 mg by mouth 2 times daily (with meals)        prednisoLONE acetate 1 % ophthalmic susp    PRED FORTE    1 Bottle    Operated eye  Start the day after surgery 1 drop four times a day for 1 week, then three times a day for 1 week, then twice a day for 1 week, then once a day for 1 week, then stop    Cataract       SIMVASTATIN PO      Take 10 mg by mouth At Bedtime        VITAMIN D (CHOLECALCIFEROL) PO      Take by mouth daily           maternal fever/meconium stained fluid/chorioamnionitis

## 2020-12-15 ENCOUNTER — OFFICE VISIT (OUTPATIENT)
Dept: OPHTHALMOLOGY | Facility: CLINIC | Age: 70
End: 2020-12-15
Attending: OPHTHALMOLOGY
Payer: MEDICARE

## 2020-12-15 DIAGNOSIS — E66.01 OBESITIES, MORBID (H): ICD-10-CM

## 2020-12-15 DIAGNOSIS — Z96.1 PSEUDOPHAKIA OF BOTH EYES: ICD-10-CM

## 2020-12-15 DIAGNOSIS — E11.9 TYPE 2 DIABETES MELLITUS WITHOUT COMPLICATION, UNSPECIFIED WHETHER LONG TERM INSULIN USE (H): Primary | ICD-10-CM

## 2020-12-15 DIAGNOSIS — H52.7 REFRACTIVE ERROR: ICD-10-CM

## 2020-12-15 PROCEDURE — G0463 HOSPITAL OUTPT CLINIC VISIT: HCPCS

## 2020-12-15 PROCEDURE — 92014 COMPRE OPH EXAM EST PT 1/>: CPT | Performed by: OPHTHALMOLOGY

## 2020-12-15 ASSESSMENT — CUP TO DISC RATIO
OD_RATIO: 0.2
OS_RATIO: 0.2

## 2020-12-15 ASSESSMENT — REFRACTION_MANIFEST
OS_AXIS: 180
OS_SPHERE: +0.75
OD_AXIS: 165
OD_SPHERE: -0.75
OD_ADD: +2.50
OS_CYLINDER: +0.50
OS_CYLINDER: +0.50
OD_AXIS: 165
OS_AXIS: 180
OD_CYLINDER: +0.50
OS_ADD: +2.50
OD_SPHERE: +0.75
OS_SPHERE: -0.75
OD_CYLINDER: +0.50

## 2020-12-15 ASSESSMENT — CONF VISUAL FIELD
OS_NORMAL: 1
OD_NORMAL: 1
METHOD: COUNTING FINGERS

## 2020-12-15 ASSESSMENT — VISUAL ACUITY
OS_SC: 20/25
OD_SC: 20/25
OS_SC+: -1
METHOD: SNELLEN - LINEAR
OD_SC+: -2

## 2020-12-15 ASSESSMENT — TONOMETRY
IOP_METHOD: ICARE
OS_IOP_MMHG: 19
OD_IOP_MMHG: 20

## 2020-12-15 ASSESSMENT — EXTERNAL EXAM - RIGHT EYE: OD_EXAM: NORMAL

## 2020-12-15 ASSESSMENT — EXTERNAL EXAM - LEFT EYE: OS_EXAM: NORMAL

## 2020-12-15 NOTE — NURSING NOTE
Chief Complaints and History of Present Illnesses   Patient presents with     Follow Up     Pseudophakia of both eyes.  DM CEE.     Chief Complaint(s) and History of Present Illness(es)     Follow Up     Laterality: both eyes    Onset: gradual    Onset: years ago    Course: stable    Associated symptoms: dryness, photophobia, glare and haloes.  Negative for eye pain, tearing, flashes and floaters    Treatments tried: artificial tears    Response to treatment: moderate improvement    Pain scale: 0/10    Comments: Pseudophakia of both eyes.  DM CEE.              Comments     Pt states vision is about the same since last visit.  Pt is wondering about blue tint glasses since she spending more time on computer/TV.   Pt does not like driving at night.      DM 2  No results found for: A1C  Per Pt last A1C was 9 about 2 weeks ago.  BS were 224 this afternoon.    AL Willoughby December 15, 2020 3:34 PM

## 2020-12-15 NOTE — PROGRESS NOTES
Chief Complaint(s) and History of Present Illness(es)     Follow Up     Laterality: both eyes    Onset: gradual    Onset: years ago    Course: stable    Associated symptoms: dryness, photophobia, glare and haloes.  Negative   for eye pain, tearing, flashes and floaters    Treatments tried: artificial tears    Response to treatment: moderate improvement    Pain scale: 0/10    Comments: Pseudophakia of both eyes.  DM CEE.              Comments     Pt states vision is about the same since last visit.  Pt is wondering   about blue tint glasses since she spending more time on computer/TV.   Pt   does not like driving at night.      DM 2  No results found for: A1C  Per Pt last A1C was 9 about 2 weeks ago.  BS were 224 this afternoon.    AL Willoughby December 15, 2020 3:34 PM              Review of systems for the eyes was negative other than the pertinent positives/negatives listed in the HPI.      Assessment & Plan      Binta Dennis is a 70 year old female with the following diagnoses:   1. Type 2 diabetes mellitus without complication, unspecified whether long term insulin use (H)    2. Obesities, morbid (H)    3. Pseudophakia of both eyes    4. Refractive error         No retinopathy  Stressed good glycemic and hypertensive control  Monitor yearly     Has recently had a recurrence of vertigo.  Will schedule return to [facility] for follow up    Dilated fundus exam looks good today  Refractive options reviewed  Refraction given       Patient disposition:   Return in about 1 year (around 12/15/2021) for DFE.           Attending Physician Attestation:  Complete documentation of historical and exam elements from today's encounter can be found in the full encounter summary report (not reduplicated in this progress note).  I personally obtained the chief complaint(s) and history of present illness.  I confirmed and edited as necessary the review of systems, past medical/surgical history, family history,  social history, and examination findings as documented by others; and I examined the patient myself.  I personally reviewed the relevant tests, images, and reports as documented above.  I formulated and edited as necessary the assessment and plan and discussed the findings and management plan with the patient and family. . - Addy Razo MD

## 2020-12-22 ENCOUNTER — TELEPHONE (OUTPATIENT)
Dept: OPHTHALMOLOGY | Facility: CLINIC | Age: 70
End: 2020-12-22

## 2021-05-26 ENCOUNTER — RECORDS - HEALTHEAST (OUTPATIENT)
Dept: ADMINISTRATIVE | Facility: CLINIC | Age: 71
End: 2021-05-26

## 2021-05-31 VITALS — WEIGHT: 293 LBS | HEIGHT: 64 IN | BODY MASS INDEX: 50.02 KG/M2

## 2021-06-01 ENCOUNTER — RECORDS - HEALTHEAST (OUTPATIENT)
Dept: ADMINISTRATIVE | Facility: CLINIC | Age: 71
End: 2021-06-01

## 2021-06-02 ENCOUNTER — RECORDS - HEALTHEAST (OUTPATIENT)
Dept: ADMINISTRATIVE | Facility: CLINIC | Age: 71
End: 2021-06-02

## 2021-06-08 NOTE — PROGRESS NOTES
[facility] Daily Progress     Patient Name: Binta Dennis  Date: 1/6/2017  Visit #: 8  PTA visit #: 2  PRECAUTIONS: none  Referral Diagnosis:   V54.81 (ICD-9-CM) - Z47.1 (ICD-10-CM) - Aftercare following joint replacement   V43.65 (ICD-9-CM) - Z96.659 (ICD-10-CM) - Total knee replacement status       (Performed 10/24/16)  Referring provider: Joseluis Robb DPM  Visit Diagnosis:     ICD-10-CM    1. Chronic pain of left knee M25.562     G89.29    2. Knee stiffness, left M25.662    3. History of surgery Z98.890    4. Unsteadiness on feet R26.81    5. Abnormality of gait R26.9    6. Generalized muscle weakness M62.81          Assessment:     HEP/POC compliance is  good .  Patient demonstrates understanding/independence with home program.  Patient is benefitting from skilled physical therapy and is making steady progress toward functional goals.  Goals nearing. It is likely that a plateau in progress is being reached, mainly due to the fact that the patient is needing to have the RIGHT knee replaced in the near future. Patient plans to have this done in the second half the year.    Goal Status:  Pt. will be independent with home exercise program in : 6 weeks. MET  Pt. will be able to walk : 30 minutes;for household mobility;for community mobility;for exercise/recreation;with less pain;with less difficulty;in 6 weeks. PROGRESSING  Patient will ascend / descend: stairs;with railing;in 6 weeks;with assistive device. PROGRESSING  Patient will increase : LEFS score;in 6 weeks;by _ points;for improved quality of life  by ___ points: >= 9. MET  Pt will: increase APTA >= 10x without UE A for improved LE strength for improved functional mobility in 6 weeks. PROGRESSING  Pt will: decrease TUG <= 15 sec for improved functional mobility and decreased risk of future falls in 6 weeks. PROGRESSING    Plan / Patient Education:     Patient would like to schedule follow-up with PT in one month to recheck progress and to  "ensure continued HEP compliance.  Patient plans to follow-up with orthopedic surgeon mid-January.    Subjective:     Pain Rating: Mild  Started doing water aerobics and stationary bike at the gym.  Continuing to do exercises, restorator, and 4inch stepper at home regularly.  Did have a bit of a \"backslide\" the other week, when my right knee acted up. It is feeling better now, but I do feel that it has hindered some of my progress.    Objective:     LEFS: 4380 (significantly improved from  on IE).   Knee ROM:    Date: 17 IE     Knee ROM ( ) AROM in degrees AROM in degrees AROM in degrees    Right Left Right Left Right Left   Knee Flexion (0-130 ) 88 (AAROM) 116 (AAROM) 90 88     Knee extension (0 ) WFL WFL        PROM in degrees PROM in degrees PROM in degrees    Right Left Right Left Right Left   Knee Flexion (0-130 )         Knee extension (0 )             TU sec with or without 4WW.  APTA: 9x without UE A (improved from 4x on IE).  Gait: WNL with use of 4WW. Mildly antalgic with increased L/R lateral sway and decreased RIGHT LE stance time with use of SEC or no AD; however, without buckling.  Stairs: Able to perform step-to pattern with bilateral handrail using L LE lead to ascend and R LE lead to descend (using surgical LE for support).      Treatment Today     TREATMENT MINUTES COMMENTS   Evaluation     Self-care/ Home management     Manual therapy     Neuromuscular Re-education     Therapeutic Activity     Therapeutic Exercises 28 Restorator x 5 min  [place]els[place]ide AA x10 B  Outcomes assessment/discussion of POC   Gait training     Modality__________________                Total 28    Blank areas are intentional and mean the treatment did not include these items.       Cristino Owens  2017    [facility] Discharge Summary  Patient Name: Binta Dennis  Date: 2017  Referral Diagnosis:   V54.81 (ICD-9-CM) - Z47.1 (ICD-10-CM) - Aftercare following joint replacement   V43.65 " "(ICD-9-CM) - Z96.659 (ICD-10-CM) - Total knee replacement status       (Performed 10/24/16)  Referring provider: Joseluis Robb DPM  Visit Diagnosis:   1. Chronic pain of left knee     2. Knee stiffness, left     3. History of surgery     4. Unsteadiness on feet     5. Abnormality of gait     6. Generalized muscle weakness         Goals:  Pt. will be independent with home exercise program in : 6 weeks. MET  Pt. will be able to walk : 30 minutes;for household mobility;for community mobility;for exercise/recreation;with less pain;with less difficulty;in 6 weeks. PROGRESSING  Patient will ascend / descend: stairs;with railing;in 6 weeks;with assistive device. PROGRESSING  Patient will increase : LEFS score;in 6 weeks;by _ points;for improved quality of life  by ___ points: >= 9. MET  Pt will: increase APTA >= 10x without UE A for improved LE strength for improved functional mobility in 6 weeks. PROGRESSING  Pt will: decrease TUG <= 15 sec for improved functional mobility and decreased risk of future falls in 6 weeks. PROGRESSING    Patient was seen for 8 visits from 11/04/16 to 01/06/17 with 0 missed appointments.  On 01/27/16, patient called to cancel remaining appointment for 02/09/16: \"Not needed. I am feeling wonderful.\"  The patient met goals and has demonstrated understanding of and independence in the home program for self-care, and progression to next steps.  She will initiate contact if questions or concerns arise.  The patient reports feeling better and did not wish to schedule further therapy at this time.    Therapy will be discontinued at this time.  The patient will need a new referral to resume.    Thank you for your referral.  Cristino Graciela  1/27/2017  1:06 PM  "

## 2021-06-12 NOTE — ANESTHESIA POSTPROCEDURE EVALUATION
Patient: Binta Dennis  RIGHT TOTAL KNEE ARTHROPLASTY  Anesthesia type: spinal    Patient location: PACU  Last vitals:   Vitals:    08/14/17 1751   BP: 96/50   Pulse: 79   Resp: 14   Temp:    SpO2: 99%     Post vital signs: stable  Level of consciousness: awake and responds to simple questions  Post-anesthesia pain: pain controlled  Post-anesthesia nausea and vomiting: no  Pulmonary: unassisted, return to baseline, nasal cannula  Cardiovascular: stable, blood pressure at baseline and hypotensive, improving while in PACU  Hydration: adequate  Anesthetic events: no    QCDR Measures:  ASA# 11 - Eri-op Cardiac Arrest: ASA11B - Patient did NOT experience unanticipated cardiac arrest  ASA# 12 - Eri-op Mortality Rate: ASA12B - Patient did NOT die  ASA# 13 - PACU Re-Intubation Rate: NA - No ETT / LMA used for case  ASA# 10 - Composite Anes Safety: ASA10A - No serious adverse event  ASA# 38 - New Corneal Injury: ASA38A - No new exposure keratitis or corneal abrasion in PACU    Additional Notes:

## 2021-06-12 NOTE — PROGRESS NOTES
[facility]      Facility:    [facility] [[id number]]  Code Status: UNKNOWN      Chief Complaint/Reason for Visit:  Chief Complaint   Patient presents with     H & P     Bilateral lower extremity osteoarthritis, status post right total knee arthroplasty, type 2 diabetes, hyperkalemia in the hospital, hypokalemia, essential hypertension, obstructive sleep apnea, anticoagulation management.       HPI:   Binta is a 67 y.o. female who was recently admitted to the hospital on 8/14/2017 for elective right total knee arthroplasty she did have a left done some time ago and this was done secondary degenerative joint disease which was refractory to conservative management.  She underwent the procedure without any perioperative or postoperative complications and I do not have a discharge summary available and this is from the patient's words.  She does have sleep apnea and is on a BiPAP or CPAP at night and she does have diabetes in which her blood sugars here have been under poor control.  She has been running anywhere from 229 up to 331.  Patient was treated appropriately and transferred here to the TCU at [facility] in stable condition.  She claims at this time she is ready to go home is going to go home today no matter what.  She is on Coumadin I do not have an INR and she is follow-up with her primary care physician.    Patient is having a little bit of pain today but she is going home which seems the oxycodone is working for her.  She is currently on Coumadin and her INR is pending.  She claims she does much better at home with her blood sugars because of the consistent carbohydrate diet she eats there and she claims she is ready to go home and walking okay.  She has no issues at this time.    Past Medical History:  Past Medical History:   Diagnosis Date     Diabetes mellitus      Gout      High cholesterol      Hypertension      Sleep apnea      Tenosynovitis Of The Wrist(S)      Created by [facility] Annotation: May 25 2014  9:52Mike Ram: left wrist            Surgical History:  Past Surgical History:   Procedure Laterality Date     BACK SURGERY       cataracts Bilateral      JOINT REPLACEMENT Left     knee     AL FERGUSON W/O FACETEC FORAMOT/[facility] 1/2 VRT SEG, CERVICAL      Description: Laminectomy Lumbar;  Recorded: 05/20/2010;     AL REMOVAL OF TONSILS,<13 Y/O      Description: Tonsillectomy;  Recorded: 05/20/2010;     AL REVISE MEDIAN N/CARPAL TUNNEL SURG      Description: Neuroplasty Decompression Median Nerve At Carpal Tunnel;  Recorded: 05/20/2010;  Comments: s/p Bilateral CTS Surgery (1990)     AL TOTAL KNEE ARTHROPLASTY Left 10/24/2016    Procedure: LEFT TOTAL KNEE  ARTHROPLASTY;  Surgeon: Joseluis Kim MD;  Location: [facility];  Service: Orthopedics     AL TOTAL KNEE ARTHROPLASTY Right 8/14/2017    Procedure: RIGHT TOTAL KNEE ARTHROPLASTY;  Surgeon: Joseluis Kim MD;  Location: [facility];  Service: Orthopedics     TONSILLECTOMY         Family History:   No family history on file.    Social History:    Social History     Social History     Marital status: Single     Spouse name: N/A     Number of children: N/A     Years of education: N/A     Social History Main Topics     Smoking status: Former Smoker     Quit date: 12/16/2009     Smokeless tobacco: Never Used     Alcohol use Yes     Drug use: No     Sexual activity: No     Other Topics Concern     None     Social History Narrative          Review of Systems   Constitutional:        Patient has occasional pain but overall she feels is well controlled.  She denies any fevers chills nausea vomiting diarrhea change in vision hearing taste or smell weakness one side or the other chest pain shortness of breath.  She denies any incontinence of urine or stool polyphagia or polydipsia polyuria depression or anxiety and the remainder of the review of systems is negative.       Vitals:    08/22/17  "0806   BP: 148/79   Pulse: 100   Resp: 20   Temp: 98.7  F (37.1  C)   SpO2: 97%       Physical Exam   Constitutional: No distress.   HENT:   Head: Normocephalic and atraumatic.   Nose: Nose normal.   Mouth/Throat: No oropharyngeal exudate.   Eyes:   Right eye has upward medial gaze consistent with strabismus.  Left eye is normal.   Neck: No thyromegaly present.   Cardiovascular: Normal rate and regular rhythm.    No murmur heard.  Pulmonary/Chest: Effort normal and breath sounds normal. No respiratory distress. She has no wheezes.   Abdominal: Soft. Bowel sounds are normal. She exhibits distension. There is no tenderness. There is no rebound.   Musculoskeletal:   Patient's range of motion on the CPM is about 70  at this time.  Incision looks fine without any signs of infection.   Neurological: She is alert. No cranial nerve deficit. She exhibits normal muscle tone.   Skin: Skin is warm and dry. She is not diaphoretic.   Psychiatric: She has a normal mood and affect. Her behavior is normal.       Medication List:  Current Outpatient Prescriptions   Medication Sig     ACCU-CHEK SMARTVIEW TEST STRIP Strp TEST FOUR TIMES DAILY     acetaminophen (TYLENOL) 500 MG tablet Take 2 tablets (1,000 mg total) by mouth 3 (three) times a day.     allopurinol (ZYLOPRIM) 300 MG tablet Take 300 mg by mouth daily.     atorvastatin (LIPITOR) 40 MG tablet Take 40 mg by mouth at bedtime.     BD INSULIN PEN NEEDLE UF SHORT 31 gauge x 5/16\" Ndle USE 1 NEEDLE DAILY AS DIRECTED     betamethasone dipropionate (DIPROLENE) 0.05 % ointment Apply 1 application topically 2 (two) times a day as needed.      celecoxib (CELEBREX) 200 MG capsule Take 200 mg by mouth daily.     cholecalciferol, vitamin D3, 2,000 unit Tab Take 2,000 Units by mouth daily.      diphenhydrAMINE (BENADRYL) 25 mg capsule Take 25 mg by mouth at bedtime as needed for allergies or sleep.      docusate sodium (COLACE) 100 MG capsule Take 1 capsule (100 mg total) by mouth 2 (two) " times a day as needed for constipation.     DULoxetine (CYMBALTA) 20 MG capsule Take 20 mg by mouth daily.     hydroCHLOROthiazide (HYDRODIURIL) 25 MG tablet Take 0.5 tablets (12.5 mg total) by mouth daily. Can be resumed in a week if renal functions are stable.     insulin detemir (LEVEMIR FLEXTOUCH) 100 unit/mL (3 mL) pen Inject 60 Units under the skin every evening.      lisinopril (PRINIVIL,ZESTRIL) 20 MG tablet Take 1 tablet (20 mg total) by mouth at bedtime. Hold for SBP <120     metFORMIN (GLUCOPHAGE) 1000 MG tablet Take 1,000 mg by mouth 2 (two) times a day with meals.     NOVOLOG 100 unit/mL injection 1 unit per 30 grams of carbs for breakfast, lunch and dinner     oxyCODONE (ROXICODONE) 5 MG immediate release tablet Take 1-2 tablets (5-10 mg total) by mouth every 4 (four) hours as needed. Take 1 tab for pain 1-6/10, take 2 tabs for pain 7-10/10.     polyethylene glycol (MIRALAX) 17 gram packet Take 1 packet (17 g total) by mouth daily as needed.     warfarin (COUMADIN) 2.5 MG tablet Take 2 tabs (5mg) Thurs. Take 1 tab (2.5mg) Fri, Sat. Then take 1-2 tabs (2.5-5mg) by mouth daily. Adjust dose based on INR results.     carboxymethylcellulose (REFRESH PLUS) 0.5 % Dpet ophthalmic dropperette Administer 1-2 drops to both eyes every 2 (two) hours as needed (dry eyes).       Labs: Hospital laboratory values are as follows; sodium was 135 on the 14th as well as a creatinine 1.10 and a GFR of 50.  And on that date hemoglobin was 9.8 in the 9.9.  I could not find anything in the laboratory values indicated a large drop in hemoglobin.  Potassium and calcium were within normal limits however she did have a potassium in the hospital 5.3.      Assessment:    ICD-10-CM    1. History of total knee arthroplasty, right Z96.651    2. Pain management R52    3. Diabetes mellitus E11.9    4. Anticoagulation management encounter Z51.81     Z79.01    5. Hyperkalemia E87.5    6. Hyponatremia E87.1        Plan: Plan at this time is  to check an INR today and check basic metabolic profile and hemoglobin her primary care's office Thursday.  Okay to discharge home with current meds and remaining narcotics at patient's request however she will need home services.  She will follow-up with her primary MD on Thursday.  This is an admission and discharge in the same time.  Patient will have home PT OT home health aide for bathing and assistance with her incision.  She will have nursing for INR checks.  And we will check an INR today and give her dose for the next couple of days.        Electronically signed by: Kunal Gutierrez DO

## 2021-06-12 NOTE — ANESTHESIA PROCEDURE NOTES
Spinal Block    Patient location during procedure: OR  Start time: 8/14/2017 2:37 PM  End time: 8/14/2017 2:42 PM  Reason for block: primary anesthetic    Staffing:  Performing  Anesthesiologist: KATHY MACK    Preanesthetic Checklist  Completed: patient identified, risks, benefits, and alternatives discussed, timeout performed, consent obtained, airway assessed, oxygen available, suction available, emergency drugs available and hand hygiene performed  Spinal Block  Patient position: sitting  Prep: ChloraPrep  Patient monitoring: heart rate, cardiac monitor, continuous pulse ox and blood pressure  Approach: midline  Location: L2-3  Injection technique: single-shot  Needle type: pencil-tip   Needle gauge: 24 G      Additional Notes:  Lot [id number]  Expiration 07/18    Negative heme, paresthesia

## 2021-06-12 NOTE — ANESTHESIA PROCEDURE NOTES
Peripheral Block    Patient location during procedure: pre-op  Start time: 8/14/2017 1:54 PM  End time: 8/14/2017 1:56 PM  post-op analgesia per surgeon order as noted in medical record  Staffing:  Performing  Anesthesiologist: KATHY MACK  Preanesthetic Checklist  Completed: patient identified, site marked, risks, benefits, and alternatives discussed, timeout performed, consent obtained, at patient's request, airway assessed, oxygen available, suction available, emergency drugs available and hand hygiene performed  Peripheral Block  Block type: saphenous, adductor canal block  Prep: ChloraPrep  Patient position: supine  Patient monitoring: cardiac monitor, continuous pulse oximetry, heart rate and blood pressure  Laterality: right  Injection technique: ultrasound guided    Ultrasound used to visualize needle placement in proximity to nerve being blocked: yes   Permanent ultrasound image captured for medical record    Needle  Needle type: Stimuplex   Needle gauge: 20G  Needle length: 4 in  no peripheral nerve catheter placed  Assessment  Injection assessment: incremental injection, no paresthesia on injection, negative aspiration for heme and no difficulty with injection  Additional Notes  Sterile transducer sheath used for both blocks

## 2021-06-12 NOTE — ANESTHESIA CARE TRANSFER NOTE
Last vitals:   Vitals:    08/14/17 1636   BP: (!) 83/47   Pulse: 83   Resp: 14   Temp: 36.6  C (97.9  F)   SpO2: 100%     Patient's level of consciousness is drowsy  Spontaneous respirations: yes  Maintains airway independently: yes  Dentition unchanged: yes  Oropharynx: oropharynx clear of all foreign objects    QCDR Measures:  ASA# 20 - Surgical Safety Checklist: WHO surgical safety checklist completed prior to induction  PQRS# 430 - Adult PONV Prevention: 4558F - Pt received => 2 anti-emetic agents (different classes) preop & intraop  ASA# 8 - Peds PONV Prevention: NA - Not pediatric patient, not GA or 2 or more risk factors NOT present  PQRS# 424 - Eri-op Temp Management: 4559F - At least one body temp DOCUMENTED => 35.5C or 95.9F within required timeframe  PQRS# 426 - PACU Transfer Protocol: - Transfer of care checklist used  ASA# 14 - Acute Post-op Pain: ASA14B - Patient did NOT experience pain >= 7 out of 10   Patient alert and orientated, SAB intact

## 2021-06-12 NOTE — ANESTHESIA PROCEDURE NOTES
Peripheral Block    Patient location during procedure: pre-op  Start time: 8/14/2017 1:51 PM  End time: 8/14/2017 1:53 PM  post-op analgesia per surgeon order as noted in medical record  Staffing:  Performing  Anesthesiologist: KATHY MACK  Preanesthetic Checklist  Completed: patient identified, site marked, risks, benefits, and alternatives discussed, timeout performed, consent obtained, at patient's request, airway assessed, oxygen available, suction available, emergency drugs available and hand hygiene performed  Peripheral Block  Block type: other, tibial  Prep: ChloraPrep  Patient position: left lateral decubitus  Patient monitoring: blood pressure, continuous pulse oximetry, cardiac monitor and heart rate  Laterality: right  Injection technique: ultrasound guided    Ultrasound used to visualize needle placement in proximity to nerve being blocked: yes   Permanent ultrasound image captured for medical record    Needle  Needle type: Stimuplex   Needle gauge: 20G  Needle length: 4 in  no peripheral nerve catheter placed  Assessment  Injection assessment: no difficulty with injection, negative aspiration for heme, no paresthesia on injection and incremental injection

## 2021-06-12 NOTE — ANESTHESIA PREPROCEDURE EVALUATION
Anesthesia Evaluation        Airway   Mallampati: II  Neck ROM: full   Pulmonary - normal exam   (+) sleep apnea on CPAP, ,                          Cardiovascular - normal exam  (+) hypertension, ,      Neuro/Psych - negative ROS     Endo/Other    (+) diabetes mellitus type 2 poorly controlled,      GI/Hepatic/Renal    (+)   chronic renal disease,           Dental - normal exam                        Anesthesia Plan  Planned anesthetic: spinal and peripheral nerve block  Tibial and saphenous nerve blocks per surgeon request for post op analgesia  ASA 3     Anesthetic plan and risks discussed with: patient

## 2021-06-13 NOTE — PROGRESS NOTES
[facility] Certification Request    September 26, 2017      Patient: Binta Dennis  MR Number: [id number]  YOB: 1950  Date of Visit: 9/26/2017      Dear Dr. Joseluis Kim:    Thank you for this referral.   We are seeing Binta Dennis in Physical Therapy s/p R TKA.    Medicare and/or Medicaid requires physician review and approval of the treatment plan. Please review the plan of care and verify that you agree with the therapy plan of care by co-signing this note.      Plan of Care  Authorization / Certification Start Date: 09/26/17  Authorization / Certification End Date: 12/25/17  Authorization / Certification Number of Visits: 12  Communication with: Referral Source  Patient Related Instruction: Nature of Condition;Treatment plan and rationale;Self Care instruction;Basis of treatment;Body mechanics;Posture;Precautions;Next steps;Expected outcome  Times per Week: 1-2  Number of Weeks: 4-8  Number of Visits: up to 12  Precautions / Restrictions : none  Therapeutic Exercise: ROM;Stretching;Strengthening  Neuromuscular Reeducation: balance/proprioception  Manual Therapy: soft tissue mobilization;joint mobilization (prn)  Gait Training: gait/stair training    Goals:  Pt. will be independent with home exercise program in : 4 weeks  Pt. will be able to walk : 30 minutes;for community mobility;for exercise/recreation;with less pain;with less difficulty;in 6 weeks;Comment  Comment:: without AD  Patient will ascend / descend: stairs;with railing;without assistive device;in 6 weeks  Patient will increase : LEFS score;in 6 weeks;for improved quality of life;by _ points  by ___ points: >= 9  Pt will: increase APTA >= 10x without UE A for improved LE strength and balance in 6 weeks.  Pt will: decrease TUG <= 10 sec without AD for improved functional mobility and decreased risk of future falls in 6 weeks.  Pt will: increase R knee flexion AROM >= 115* for greater ease with stairs and transfers in 6  weeks.      If you have any questions or concerns, please don't hesitate to call.    Sincerely,      Cristino Owens, PT        Physician recommendation:     ___ Follow therapist's recommendation        ___ Modify therapy      *Physician co-signature indicates they certify the need for these services furnished within this plan and while under their care.    [facility]   Initial Evaluation    Patient Name: Binta Dennis  Date of evaluation: 9/26/2017  PRECAUTIONS: none  Referral Diagnosis: OA of R knee (s/p R TKA 08/14/17; 12 visits max)  Referring provider: Joseluis Kim MD  Visit Diagnosis:     ICD-10-CM    1. Chronic pain of right knee M25.561     G89.29    2. Decreased ROM of right knee M25.661    3. Generalized muscle weakness M62.81    4. Unsteadiness on feet R26.81    5. Unsteady gait R26.81    6. History of total right knee replacement Z96.651        Assessment:      Pt. is appropriate for skilled PT intervention as outlined in the Plan of Care (POC).  Pt. is a good candidate for skilled PT services to improve pain levels and function.  Pt presents to PT s/p R TKA performed 08/14/17. Overall, patient is progressing quite well s/p surgery. Continued impairments remain, including pain, decreased ROM, decreased strength, decreased balance, and impaired gait and stairs. Pt should benefit well from skilled PT for successful return to PLOF and improved overall QOL.    Goals:  Pt. will be independent with home exercise program in : 4 weeks  Pt. will be able to walk : 30 minutes;for community mobility;for exercise/recreation;with less pain;with less difficulty;in 6 weeks;Comment  Comment:: without AD  Patient will ascend / descend: stairs;with railing;without assistive device;in 6 weeks  Patient will increase : LEFS score;in 6 weeks;for improved quality of life;by _ points  by ___ points: >= 9  Pt will: increase APTA >= 10x without UE A for improved LE strength and balance in 6 weeks.  Pt will:  "decrease TUG <= 10 sec without AD for improved functional mobility and decreased risk of future falls in 6 weeks.  Pt will: increase R knee flexion AROM >= 115* for greater ease with stairs and transfers in 6 weeks.    Patient's expectations/goals are realistic.    Barriers to Learning or Achieving Goals:  Co-morbidities or other medical factors.  obesity       Plan / Patient Instructions:        Plan of Care:   Authorization / Certification Start Date: 17  Authorization / Certification End Date: 17  Authorization / Certification Number of Visits: 12  Communication with: Referral Source  Patient Related Instruction: Nature of Condition;Treatment plan and rationale;Self Care instruction;Basis of treatment;Body mechanics;Posture;Precautions;Next steps;Expected outcome  Times per Week: 1-2  Number of Weeks: 4-8  Number of Visits: up to 12  Precautions / Restrictions : none  Therapeutic Exercise: ROM;Stretching;Strengthening  Neuromuscular Reeducation: balance/proprioception  Manual Therapy: soft tissue mobilization;joint mobilization (prn)  Gait Training: gait/stair training    Plan for next visit: Restorator > Review standing LE strength/balance exercises that patient is performing for home. Continue with emphasis on standing LE strength and proprioceptive training.     Subjective:       History of Present Illness:    Binta is a 67 y.o. female who presents to therapy today s/p [place] [facility]  [facility] [place] Discharged home on 17. Received home care PT after TCU stay up to 17.  Started driving this past Thursday.  Lives in a second floor apartment. Elevator access with garage parking.  Does have SEC and 4WW at home.    Pt seeks PT to \"walk with move with more confidence.\"    Pain Ratin  Pain rating at best: 0  Pain rating at worst: 6  Pain description: aching, dull, pain, soreness and weakness     Objective:    "   Patient Outcome Measures :    Lower Extremity Functional Scale (_/80): 42   Scores range from 0-80, where a score of 80 represents maximum function. The minimal clinically important difference is a positive change of 9 points.    Examination  1. Chronic pain of right knee     2. Decreased ROM of right knee     3. Generalized muscle weakness     4. Unsteadiness on feet     5. Unsteady gait     6. History of total right knee replacement       Precautions/Restrictions: None  Involved side: Right  Posture Observation:      General sitting posture is  normal.  General standing posture is normal.    Incision: clean, dry, and intact. Minimal swelling and no redness noted. Healing well.  Gait and Stairs: Pt ambulates into clinic with SEC in L UE. Increased L/R lateral sway, with mild unsteadiness, but without LOB. Increased time required for turning L/R.  TU sec with SEC.  APTA: 6x with intermittent use of B UE for support.  Active SLR: No quad lag in R LE x10 reps.    Knee ROM:    Date:       Knee ROM ( ) AROM in degrees AROM in degrees AROM in degrees    Right Left Right Left Right Left   Knee Flexion (0-130 ) 101        Knee extension (0 ) 0         PROM in degrees PROM in degrees PROM in degrees    Right Left Right Left Right Left   Knee Flexion (0-130 ) 102        Knee extension (0 ) 0            Treatment Today     TREATMENT MINUTES COMMENTS   Evaluation 20 R TKA   Self-care/ Home management     Manual therapy     Neuromuscular Re-education     Therapeutic Activity     Therapeutic Exercises 20 -Reviewed therapy prognosis, POC, basis for treatment.  -Verbal review of patient's current HEP.  -Ex per flow sheet   Gait training     Modality__________________                Total 40    Blank areas are intentional and mean the treatment did not include these items.            PT Evaluation Code: (Please list factors)  Patient History/Comorbidities: obesity  Examination: R TKA  Clinical Presentation: Stable  Clinical  Decision Making: Low    Patient History/  Comorbidities Examination  (body structures and functions, activity limitations, and/or participation restrictions) Clinical Presentation Clinical Decision Making (Complexity)   No documented Comorbidities or personal factors 1-2 Elements Stable and/or uncomplicated Low   1-2 documented comorbidities or personal factor 3 Elements Evolving clinical presentation with changing characteristics Moderate   3-4 documented comorbidities or personal factors 4 or more Unstable and unpredictable High Cristino Owens  9/26/2017  9:13 AM

## 2021-06-13 NOTE — PROGRESS NOTES
"[facility] Daily Progress     Patient Name: Binta Dennis  Date: 10/5/2017  Visit #: 3/12 through 12/25/17  PTA visit #:    PRECAUTIONS: none  Referral Diagnosis: OA of R knee (s/p R TKA 08/14/17; 12 visits max)  Referring provider: Joseluis Kim MD  Visit Diagnosis:     ICD-10-CM    1. Chronic pain of right knee M25.561     G89.29    2. Decreased ROM of right knee M25.661    3. Generalized muscle weakness M62.81    4. Unsteadiness on feet R26.81    5. Unsteady gait R26.81    6. History of total right knee replacement Z96.651          Assessment:     HEP/POC compliance is  good .  Patient demonstrates understanding/independence with home program.  Patient is appropriate to continue with skilled physical therapy intervention, as indicated by initial plan of care.   Pt with good tolerance for all ex without increased pain; fatigue only reported.    Goal Status:  Pt. will be independent with home exercise program in : 4 weeks  Pt. will be able to walk : 30 minutes;for community mobility;for exercise/recreation;with less pain;with less difficulty;in 6 weeks;Comment  Comment:: without AD  Patient will ascend / descend: stairs;with railing;without assistive device;in 6 weeks  Patient will increase : LEFS score;in 6 weeks;for improved quality of life;by _ points  by ___ points: >= 9  Pt will: increase APTA >= 10x without UE A for improved LE strength and balance in 6 weeks.  Pt will: decrease TUG <= 10 sec without AD for improved functional mobility and decreased risk of future falls in 6 weeks.  Pt will: increase R knee flexion AROM >= 115* for greater ease with stairs and transfers in 6 weeks.    Plan / Patient Education:     Continue per POC, progressing R TKR protocol as tolerated: ROM, LE strength, and balance.  Progress step height and reformer resistance as tolerated.  Pt plans to follow-up with surgeon on 10/10/17.    Subjective:     Pain Rating: \"It's okay. Just a little bit here and " "there.\"  Feeling more pain in the R LE. Usually happens after 3-4 reps of each exercise, so stopping then, but making sure to do them more often in the day.  Will be going back to see the surgeon on 10/10/17.  Taken off Celebrex for a little while.  Overall, feeling so much better than compared to before the surgery. Getting more energy even.    Objective:     Pt ambulates into clinic with SEC, increased L/R lateral sway with wide CA.  Knee ROM:    Date:       Knee ROM ( ) AROM in degrees AROM in degrees AROM in degrees    Right Left Right Left Right Left   Knee Flexion (0-130 ) 106        Knee extension (0 ) 0         PROM in degrees PROM in degrees PROM in degrees    Right Left Right Left Right Left   Knee Flexion (0-130 ) 106        Knee extension (0 ) 0            Treatment Today     TREATMENT MINUTES COMMENTS   Evaluation     Self-care/ Home management     Manual therapy     Neuromuscular Re-education     Therapeutic Activity     Therapeutic Exercises 41 Ex per flow sheet   Gait training     Modality__________________                Total 41    Blank areas are intentional and mean the treatment did not include these items.       Cristino Owens  10/5/2017    "

## 2021-06-13 NOTE — PROGRESS NOTES
"[facility] Daily Progress     Patient Name: Binta Dennis  Date: 9/28/2017  Visit #: 2/12 through 12/25/17  PTA visit #:    PRECAUTIONS: none  Referral Diagnosis: OA of R knee (s/p R TKA 08/14/17; 12 visits max)  Referring provider: Joseluis Kim MD  Visit Diagnosis:     ICD-10-CM    1. Chronic pain of right knee M25.561     G89.29    2. Decreased ROM of right knee M25.661    3. Generalized muscle weakness M62.81    4. Unsteadiness on feet R26.81    5. Unsteady gait R26.81    6. History of total right knee replacement Z96.651          Assessment:     HEP/POC compliance is  good .  Patient demonstrates understanding/independence with home program.  Patient is appropriate to continue with skilled physical therapy intervention, as indicated by initial plan of care.   Pt with good tolerance for all ex without increased pain; fatigue only reported.    Goal Status:  Pt. will be independent with home exercise program in : 4 weeks  Pt. will be able to walk : 30 minutes;for community mobility;for exercise/recreation;with less pain;with less difficulty;in 6 weeks;Comment  Comment:: without AD  Patient will ascend / descend: stairs;with railing;without assistive device;in 6 weeks  Patient will increase : LEFS score;in 6 weeks;for improved quality of life;by _ points  by ___ points: >= 9  Pt will: increase APTA >= 10x without UE A for improved LE strength and balance in 6 weeks.  Pt will: decrease TUG <= 10 sec without AD for improved functional mobility and decreased risk of future falls in 6 weeks.  Pt will: increase R knee flexion AROM >= 115* for greater ease with stairs and transfers in 6 weeks.    Plan / Patient Education:     Continue per POC, progressing R TKR protocol as tolerated: ROM, LE strength, and balance.  Progress step height and reformer resistance as tolerated.    Subjective:     Pain Rating: \"Moderate\" R knee  Practicing the sit<>stands without using my hands, and the R knee is a little more " so. (Reviewed that this was only a test on the initial eval, and that she did not necessarily need to complete for home, but patient states she would still like to work on it. Will just do less repetitions to limit soreness).   Looking to do some more walking, either to help take a friend's baby for a stroll or take their dog for a walk.    Objective:     Pt ambulates into clinic with SEC, increased L/R lateral sway with wide CA.  R knee flexion PROM: 102*    Treatment Today     TREATMENT MINUTES COMMENTS   Evaluation     Self-care/ Home management     Manual therapy     Neuromuscular Re-education     Therapeutic Activity     Therapeutic Exercises 33 Ex per flow sheet   Gait training     Modality__________________                Total 33    Blank areas are intentional and mean the treatment did not include these items.       Cristino Owens  9/28/2017

## 2021-06-13 NOTE — PROGRESS NOTES
[facility] Daily Progress     Patient Name: Binta Dennis  Date: 10/19/2017  Visit #: 3/12  PTA visit #:  2  Referral Diagnosis: TKA on left  Referring provider: Julio  Visit Diagnosis:     ICD-10-CM    1. Chronic pain of right knee M25.561     G89.29    2. Decreased ROM of right knee M25.661    3. Generalized muscle weakness M62.81    4. Unsteadiness on feet R26.81    5. Unsteady gait R26.81    6. History of total right knee replacement Z96.651    7. Chronic pain of left knee M25.562     G89.29    8. Knee stiffness, left M25.662    9. History of surgery Z98.890    10. Abnormality of gait R26.9    11. Bilateral knee pain M25.561     M25.562    12. Knee stiffness, right M25.661          Assessment:     Cues for HEP/ stair climbing and gait needed  Increased left knee ROM    Goal Status: ongoing  Pt. will be independent with home exercise program in : 4 weeks  Pt. will be able to walk : 30 minutes;for community mobility;for exercise/recreation;with less pain;with less difficulty;in 6 weeks;Comment  Comment:: without AD  Patient will ascend / descend: stairs;with railing;without assistive device;in 6 weeks  Patient will increase : LEFS score;in 6 weeks;for improved quality of life;by _ points  by ___ points: >= 9  Pt will: increase APTA >= 10x without UE A for improved LE strength and balance in 6 weeks.  Pt will: decrease TUG <= 10 sec without AD for improved functional mobility and decreased risk of future falls in 6 weeks.  Pt will: increase R knee flexion AROM >= 115* for greater ease with stairs and transfers in 6 weeks.    Plan / Patient Education:     Continue POC  Progress ex as able    Subjective:     Pain Ratin/10 left knee pt states she has been sick for a few days so has slacked off some of ex   Pt states walking is going good,still swelling in the leg, ex 2x/day      Objective:      Reviewed HEP cues needed  0-109 right knee ROM  Pitted edema in right ankle noted  Standing gastroc  stretch    Treatment Today       TREATMENT MINUTES COMMENTS   Evaluation     Self-care/ Home management     Manual therapy     Neuromuscular Re-education     Therapeutic Activity     Therapeutic Exercises 25 Ex per flow sheet   Gait training     Modality__________________                Total 25    Blank areas are intentional and mean the treatment did not include these items.       Robert Wheat  10/19/2017

## 2021-06-13 NOTE — PROGRESS NOTES
[facility] Daily Progress     Patient Name: Binta Dennis  Date: 10/26/2017  Visit #:  through 17  PTA visit #:  1  PRECAUTIONS: none  Referral Diagnosis: OA of R knee (s/p R TKA 17; 12 visits max)  Referring provider: Joseluis Kim MD  Visit Diagnosis:     ICD-10-CM    1. Chronic pain of right knee M25.561     G89.29    2. Decreased ROM of right knee M25.661    3. Generalized muscle weakness M62.81    4. Unsteadiness on feet R26.81    5. Unsteady gait R26.81    6. History of total right knee replacement Z96.651          Assessment:     HEP/POC compliance is  good .  Patient demonstrates understanding/independence with home program.  Patient is appropriate to continue with skilled physical therapy intervention, as indicated by initial plan of care.   Overall, patient demonstrating good progress towards goals, with improving ROM, strength, and function from start of care.    Goal Status:  Pt. will be independent with home exercise program in : 4 weeks  Pt. will be able to walk : 30 minutes;for community mobility;for exercise/recreation;with less pain;with less difficulty;in 6 weeks;Comment  Comment:: without AD  Patient will ascend / descend: stairs;with railing;without assistive device;in 6 weeks  Patient will increase : LEFS score;in 6 weeks;for improved quality of life;by _ points  by ___ points: >= 9  Pt will: increase APTA >= 10x without UE A for improved LE strength and balance in 6 weeks.  Pt will: decrease TUG <= 10 sec without AD for improved functional mobility and decreased risk of future falls in 6 weeks.  Pt will: increase R knee flexion AROM >= 115* for greater ease with stairs and transfers in 6 weeks.    Plan / Patient Education:     Continue per POC, progressing R TKR protocol as tolerated: ROM, LE strength, and balance.  Progress step height and reformer resistance as tolerated.    Subjective:     Pain Ratin/10 R knee.  Denies extra pain medication use.  Has not  been back to the pool yet due the flu, but looking to return soon.    Objective:     Gait: Pt ambulates into clinic with SEC at her side, but not WB through it. Increased L/R latera sway, mostly due to extra weight.  Stairs: Able to reciprocally ascend and descend 10 stairs but with B handrails and with increased effort.  TU sec, 13 sec, 12 sec x 3 trials without AD.  APTA: 10x without UE A.  Knee ROM:    Date:       Knee ROM ( ) AROM in degrees AROM in degrees AROM in degrees    Right Left Right Left Right Left   Knee Flexion (0-130 )         Knee extension (0 ) 0         PROM in degrees PROM in degrees PROM in degrees    Right Left Right Left Right Left   Knee Flexion (0-130 ) 100 (increased to 108* post MT)        Knee extension (0 ) 0            Treatment Today     TREATMENT MINUTES COMMENTS   Evaluation     Self-care/ Home management     Manual therapy 5 Supine R tibiofemoral posterior glides to increase R knee flexion ROM 4x30 reps grade III   Neuromuscular Re-education     Therapeutic Activity     Therapeutic Exercises 30 Ex per flow sheet  Outcomes assessment   Gait training     Modality__________________                Total 35    Blank areas are intentional and mean the treatment did not include these items.       Cristino Owens  10/26/2017

## 2021-06-14 NOTE — PROGRESS NOTES
[facility] Daily Progress     Patient Name: Binta Dennis  Date: 11/29/2017  Visit #: 9/12 through 12/25/17  PTA visit #:  1  PRECAUTIONS: none  Referral Diagnosis: OA of R knee (s/p R TKA 08/14/17; 12 visits max)  Referring provider: Joseluis Kim MD  Visit Diagnosis:     ICD-10-CM    1. Chronic pain of right knee M25.561     G89.29    2. Decreased ROM of right knee M25.661    3. Generalized muscle weakness M62.81    4. Unsteadiness on feet R26.81    5. Unsteady gait R26.81    6. History of total right knee replacement Z96.651          Assessment:     HEP/POC compliance is  good .  Patient demonstrates understanding/independence with home program.  Patient is appropriate to continue with skilled physical therapy intervention, as indicated by initial plan of care.   Overall, patient demonstrating good progress towards goals, with improving ROM, strength, and function from start of care.  Intermittent seated rest breaks required due to SOB and LE fatigue.    Goal Status:  Pt. will be independent with home exercise program in : 4 weeks  Pt. will be able to walk : 30 minutes;for community mobility;for exercise/recreation;with less pain;with less difficulty;in 6 weeks;Comment  Comment:: without AD  Patient will ascend / descend: stairs;with railing;without assistive device;in 6 weeks  Patient will increase : LEFS score;in 6 weeks;for improved quality of life;by _ points  by ___ points: >= 9  Pt will: increase APTA >= 10x without UE A for improved LE strength and balance in 6 weeks.  Pt will: decrease TUG <= 10 sec without AD for improved functional mobility and decreased risk of future falls in 6 weeks.  Pt will: increase R knee flexion AROM >= 115* for greater ease with stairs and transfers in 6 weeks.    Plan / Patient Education:     Continue per POC, progressing R TKR protocol as tolerated: ROM, LE strength, and balance.  Continue posterior glides to R tibiofemoral joint to increase R knee flexion  ROM.    Subjective:     Pain Ratin/10 R knee.  Not much new since last visit. No falls. Feeling a bit less energy lately, possibly related to poor sleep while staying at her sisters.    Objective:     Pt ambulates into clinic without AD, increased left/right lateral sway, mostly due to protruding abdomen.  R knee flexion AAROM: 107* (110* on L, measured on the reformer).    Treatment Today     TREATMENT MINUTES COMMENTS   Evaluation     Self-care/ Home management     Manual therapy       Neuromuscular Re-education     Therapeutic Activity     Therapeutic Exercises 29 Ex per flow sheet   Gait training     Modality__________________                Total 29    Blank areas are intentional and mean the treatment did not include these items.       Cristino Owens  2017

## 2021-06-14 NOTE — PROGRESS NOTES
[facility] Daily Progress     Patient Name: Binta Dennis  Date: 12/6/2017  Visit #: 10/12 through 12/25/17  PTA visit #:  1  PRECAUTIONS: none  Referral Diagnosis: OA of R knee (s/p R TKA 08/14/17; 12 visits max)  Referring provider: Joseluis Kim MD  Visit Diagnosis:     ICD-10-CM    1. Chronic pain of right knee M25.561     G89.29    2. Decreased ROM of right knee M25.661    3. Generalized muscle weakness M62.81    4. Unsteadiness on feet R26.81    5. Unsteady gait R26.81    6. History of total right knee replacement Z96.651          Assessment:     HEP/POC compliance is  good .  Patient demonstrates understanding/independence with home program.  Patient is appropriate to continue with skilled physical therapy intervention, as indicated by initial plan of care.   Overall, patient demonstrating good progress towards goals, with improving ROM, strength, and function from start of care.  Intermittent seated rest breaks required due to SOB and LE fatigue.    Goal Status:  Pt. will be independent with home exercise program in : 4 weeks  Pt. will be able to walk : 30 minutes;for community mobility;for exercise/recreation;with less pain;with less difficulty;in 6 weeks;Comment  Comment:: without AD  Patient will ascend / descend: stairs;with railing;without assistive device;in 6 weeks  Patient will increase : LEFS score;in 6 weeks;for improved quality of life;by _ points  by ___ points: >= 9  Pt will: increase APTA >= 10x without UE A for improved LE strength and balance in 6 weeks.  Pt will: decrease TUG <= 10 sec without AD for improved functional mobility and decreased risk of future falls in 6 weeks.  Pt will: increase R knee flexion AROM >= 115* for greater ease with stairs and transfers in 6 weeks.    Plan / Patient Education:     Continue per POC x2 visits, progressing R TKR protocol as tolerated: ROM, LE strength, and balance.  Continue posterior glides to R tibiofemoral joint to increase R knee  flexion ROM.    Subjective:     Pain Ratin/10 R knee.  Not much new since last visit. No falls.   Continuing to work on ex regularly.  Has not done any pool classes since the last week- feeling low on energy still. Thinking about following up with the doctor.    Objective:     Pt ambulates into clinic with SEC, increased left/right lateral sway, mostly due to protruding abdomen.  R knee flexion AAROM: 107*, increased to 108* post treatment.    Treatment Today     TREATMENT MINUTES COMMENTS   Evaluation     Self-care/ Home management     Manual therapy 5 R tibiofemoral posterior glides grade III>IV 5x30 reps to increase R knee flexion ROM   Neuromuscular Re-education     Therapeutic Activity     Therapeutic Exercises 29 Ex per flow sheet   Gait training     Modality__________________                Total 34    Blank areas are intentional and mean the treatment did not include these items.       Cristino Owens  2017

## 2021-06-14 NOTE — PROGRESS NOTES
[facility] Daily Progress     Patient Name: Binta Dennis  Date: 12/13/2017  Visit #: 11/12 through 12/25/17  PTA visit #:  1  PRECAUTIONS: none  Referral Diagnosis: OA of R knee (s/p R TKA 08/14/17; 12 visits max)  Referring provider: Joseluis Kmi MD  Visit Diagnosis:     ICD-10-CM    1. Chronic pain of right knee M25.561     G89.29    2. Decreased ROM of right knee M25.661    3. Generalized muscle weakness M62.81    4. Unsteadiness on feet R26.81    5. Unsteady gait R26.81    6. History of total right knee replacement Z96.651          Assessment:     HEP/POC compliance is  good .  Patient demonstrates understanding/independence with home program.  Patient is appropriate to continue with skilled physical therapy intervention, as indicated by initial plan of care.   Overall, patient demonstrating good progress towards goals, with improving ROM, strength, and function from start of care.  Intermittent seated rest breaks required due to SOB and LE fatigue.    Goal Status:  Pt. will be independent with home exercise program in : 4 weeks  Pt. will be able to walk : 30 minutes;for community mobility;for exercise/recreation;with less pain;with less difficulty;in 6 weeks;Comment  Comment:: without AD  Patient will ascend / descend: stairs;with railing;without assistive device;in 6 weeks  Patient will increase : LEFS score;in 6 weeks;for improved quality of life;by _ points  by ___ points: >= 9  Pt will: increase APTA >= 10x without UE A for improved LE strength and balance in 6 weeks.  Pt will: decrease TUG <= 10 sec without AD for improved functional mobility and decreased risk of future falls in 6 weeks.  Pt will: increase R knee flexion AROM >= 115* for greater ease with stairs and transfers in 6 weeks.    Plan / Patient Education:     Continue per POC x1 visits, progressing R TKR protocol as tolerated: ROM, LE strength, and balance.  Reassess goals, outcomes, LEFS next visit.    Subjective:     Pain  Ratin/10 R knee.  Not much new since last visit. No falls.    Objective:     Pt ambulates into clinic with SEC, increased left/right lateral sway, mostly due to protruding abdomen. Cues for increased B step length provided.    Treatment Today     TREATMENT MINUTES COMMENTS   Evaluation     Self-care/ Home management     Manual therapy  R tibiofemoral posterior glides grade III>IV 5x30 reps to increase R knee flexion ROM  (not performed today)   Neuromuscular Re-education     Therapeutic Activity     Therapeutic Exercises 31 Ex per flow sheet   Gait training     Modality__________________                Total 31    Blank areas are intentional and mean the treatment did not include these items.       Cristino Owens  2017

## 2021-06-14 NOTE — PROGRESS NOTES
[facility] Daily Progress     Patient Name: Binta Dennis  Date: 2017  Visit #:  through 17  PTA visit #:  1  PRECAUTIONS: none  Referral Diagnosis: OA of R knee (s/p R TKA 17; 12 visits max)  Referring provider: Joseluis Kim MD  Visit Diagnosis:     ICD-10-CM    1. Chronic pain of right knee M25.561     G89.29    2. Decreased ROM of right knee M25.661    3. Generalized muscle weakness M62.81    4. Unsteadiness on feet R26.81    5. Unsteady gait R26.81    6. History of total right knee replacement Z96.651          Assessment:     HEP/POC compliance is  good .  Patient demonstrates understanding/independence with home program.  Patient is appropriate to continue with skilled physical therapy intervention, as indicated by initial plan of care.   Overall, patient demonstrating good progress towards goals, with improving ROM, strength, and function from start of care.    Goal Status:  Pt. will be independent with home exercise program in : 4 weeks  Pt. will be able to walk : 30 minutes;for community mobility;for exercise/recreation;with less pain;with less difficulty;in 6 weeks;Comment  Comment:: without AD  Patient will ascend / descend: stairs;with railing;without assistive device;in 6 weeks  Patient will increase : LEFS score;in 6 weeks;for improved quality of life;by _ points  by ___ points: >= 9  Pt will: increase APTA >= 10x without UE A for improved LE strength and balance in 6 weeks.  Pt will: decrease TUG <= 10 sec without AD for improved functional mobility and decreased risk of future falls in 6 weeks.  Pt will: increase R knee flexion AROM >= 115* for greater ease with stairs and transfers in 6 weeks.    Plan / Patient Education:     Continue per POC, progressing R TKR protocol as tolerated: ROM, LE strength, and balance.  Continue posterior glides to R tibiofemoral joint to increase R knee flexion ROM.    Subjective:     Pain Ratin/10 R knee.  No falls since last  visit. Working to get more walking in day to day. Stairs are about 50% step to and reciprocal.    Objective:     Intermittent mild LOB with sit<>stands from EOM with L foot in front due to decreased pain, but patient able to self-correct.  Pt ambulates into clinic without AD, increased left/right lateral sway, mostly due to protruding abdomen.  LEFS: 46/80    Treatment Today     TREATMENT MINUTES COMMENTS   Evaluation     Self-care/ Home management     Manual therapy       Neuromuscular Re-education     Therapeutic Activity     Therapeutic Exercises 32 Ex per flow sheet   Gait training     Modality__________________                Total 32    Blank areas are intentional and mean the treatment did not include these items.       Cristino Owens  11/22/2017

## 2021-06-14 NOTE — PROGRESS NOTES
[facility] Daily Progress     Patient Name: Binta Dennis  Date: 11/15/2017  Visit #: 7/12 through 12/25/17  PTA visit #:  1  PRECAUTIONS: none  Referral Diagnosis: OA of R knee (s/p R TKA 08/14/17; 12 visits max)  Referring provider: Joseluis iKm MD  Visit Diagnosis:     ICD-10-CM    1. Chronic pain of right knee M25.561     G89.29    2. Decreased ROM of right knee M25.661    3. Generalized muscle weakness M62.81    4. Unsteadiness on feet R26.81    5. Unsteady gait R26.81    6. History of total right knee replacement Z96.651          Assessment:     HEP/POC compliance is  good .  Patient demonstrates understanding/independence with home program.  Patient is appropriate to continue with skilled physical therapy intervention, as indicated by initial plan of care.   Overall, patient demonstrating good progress towards goals, with improving ROM, strength, and function from start of care.    Goal Status:  Pt. will be independent with home exercise program in : 4 weeks  Pt. will be able to walk : 30 minutes;for community mobility;for exercise/recreation;with less pain;with less difficulty;in 6 weeks;Comment  Comment:: without AD  Patient will ascend / descend: stairs;with railing;without assistive device;in 6 weeks  Patient will increase : LEFS score;in 6 weeks;for improved quality of life;by _ points  by ___ points: >= 9  Pt will: increase APTA >= 10x without UE A for improved LE strength and balance in 6 weeks.  Pt will: decrease TUG <= 10 sec without AD for improved functional mobility and decreased risk of future falls in 6 weeks.  Pt will: increase R knee flexion AROM >= 115* for greater ease with stairs and transfers in 6 weeks.    Plan / Patient Education:     Continue per POC, progressing R TKR protocol as tolerated: ROM, LE strength, and balance.  Continue posterior glides to R tibiofemoral joint to increase R knee flexion ROM.  Trial sit>stands from EOM with primary use of R UE.    Subjective:      Pain Ratin/10 R knee.  No falls since last visit. Working a lot on my balance over the last week. Continuing to do the pool and restorator for exercise outside of PT.  Saw the surgeon yesterday, who is pleased with progress. Plan to follow-up in 1 one year.    Objective:     Semi Tandem Stance: 18 sec with L foot forwards, 10 sec with R foot forwards.  R knee flexion PROM: 110*, increased to 112* post MT.    Treatment Today     TREATMENT MINUTES COMMENTS   Evaluation     Self-care/ Home management     Manual therapy 2 Supine R tibiofemoral posterior glides to increase R knee flexion ROM 2x30 reps grade III     Neuromuscular Re-education 13 Balance ex per flow sheet   Therapeutic Activity     Therapeutic Exercises 20 Ex per flow sheet   Gait training     Modality__________________                Total 35    Blank areas are intentional and mean the treatment did not include these items.       Cristino Owens  11/15/2017

## 2021-06-14 NOTE — PROGRESS NOTES
[facility] Daily Progress     Patient Name: Binta Dennis  Date: 2017  Visit #:  through 17  PTA visit #:  1  PRECAUTIONS: none  Referral Diagnosis: OA of R knee (s/p R TKA 17; 12 visits max)  Referring provider: Joseluis Kim MD  Visit Diagnosis:     ICD-10-CM    1. Chronic pain of right knee M25.561     G89.29    2. Decreased ROM of right knee M25.661    3. Generalized muscle weakness M62.81    4. Unsteadiness on feet R26.81    5. Unsteady gait R26.81          Assessment:     HEP/POC compliance is  good .  Patient demonstrates understanding/independence with home program.  Patient is appropriate to continue with skilled physical therapy intervention, as indicated by initial plan of care.   Overall, patient demonstrating good progress towards goals, with improving ROM, strength, and function from start of care.    Goal Status:  Pt. will be independent with home exercise program in : 4 weeks  Pt. will be able to walk : 30 minutes;for community mobility;for exercise/recreation;with less pain;with less difficulty;in 6 weeks;Comment  Comment:: without AD  Patient will ascend / descend: stairs;with railing;without assistive device;in 6 weeks  Patient will increase : LEFS score;in 6 weeks;for improved quality of life;by _ points  by ___ points: >= 9  Pt will: increase APTA >= 10x without UE A for improved LE strength and balance in 6 weeks.  Pt will: decrease TUG <= 10 sec without AD for improved functional mobility and decreased risk of future falls in 6 weeks.  Pt will: increase R knee flexion AROM >= 115* for greater ease with stairs and transfers in 6 weeks.    Plan / Patient Education:     Continue per POC, progressing R TKR protocol as tolerated: ROM, LE strength, and balance.  Review newly added balance ex, progressing as able.  Continue posterior glides to R tibiofemoral joint to increase R knee flexion ROM.    Subjective:     Pain Ratin/10 R knee.  Pt reports she had  "fallen two days ago outside her rec center. She thinks she just did not get her foot fully onto the curb, which caused her to loose her balance and fall backwards onto her behind. She denies much pain or injury afterwards, but does report mild B shoulder soreness, which she attributes to possibly reaching behind her while falling.  \"I think I might be just getting a little too confident.\"  Surgeon follow-up scheduled for 11/14.    Objective:     Pt unable to maintain SLS or tandem stance without consistent wall A. Pt able to maintain semi-tandem stance with several minor LOB requiring wall A in 30 sec intervals. Added to HEP, along with limits of stability training F/B, L/R and circles CW/CCW to improve LE proprioception and decreased risk of future falls.    Treatment Today     TREATMENT MINUTES COMMENTS   Evaluation     Self-care/ Home management     Manual therapy  Supine R tibiofemoral posterior glides to increase R knee flexion ROM 4x30 reps grade III  (not performed today)   Neuromuscular Re-education 20 Balance ex per flow sheet  -Demonstrated fall recovery floor>chair/stair>stand options in the event of a future fall   Therapeutic Activity     Therapeutic Exercises 15 Ex per flow sheet   Gait training     Modality__________________                Total 35    Blank areas are intentional and mean the treatment did not include these items.       Cristino Owens  11/9/2017    "

## 2021-06-16 PROBLEM — R79.89 ELEVATED LACTIC ACID LEVEL: Status: ACTIVE | Noted: 2017-07-02

## 2021-06-16 PROBLEM — A41.9 SEPSIS (H): Status: ACTIVE | Noted: 2017-07-02

## 2021-06-16 PROBLEM — L03.90 CELLULITIS: Status: ACTIVE | Noted: 2017-07-02

## 2021-06-16 PROBLEM — K76.0 STEATOSIS OF LIVER: Status: ACTIVE | Noted: 2017-04-10

## 2021-06-17 NOTE — PROGRESS NOTES
Subjective findings: The patient presented to  the clinic today for continued foot care complaining    of long painful nails on both feet.  The patient also complained of thick skin on the bottom of her left heel.  She has an old pair of orthotics which she stated aggravate her foot.  She was originally prescribed orthotics for pronation deformity.      OBJECTIVE FINDINGS: Elongated nails 1 through 5 bilateral feet. Nails are    normal thickness and normal color.Skin  bilaterally warm, supple, and intact.  There is thick hyperkeratotic tissue on the plantar aspect of the left heel.  There is cracking of the thick hyperkeratotic tissue left heel.  DP and PT    pulses +1/4 bilaterally. Capillary refill less than 2 seconds bilaterally.    Negative clonus. Negative Babinski bilaterally. Range of motion within    normal limits bilaterally. Muscle power +4/5 bilaterally within all    compartments.        ASSESSMENT:  Onychauxis , tylomas, pronation deformity, diabetes mellitus.        PLAN: Trimmed dystrophic nails 1 through 5 both feet today.  I also debrided the hyperkeratotic lesion left foot.  I have recommended AmLactin cream to be applied to this area the foot.  I have also recommended extra-depth diabetic shoes with molded insoles.  I recommended that the patient return to the clinic every 2 months for continued diabetic foot care

## 2021-06-18 NOTE — PROGRESS NOTES
"[facility]: [phone].  S: Pt seen at [facility] with reports that she is interested in getting the diabetic shoes as Rx'd. She reports no sores or other maladies with the feet and that she has xwide feet. O: I see the Rx for the \"Diabetic shoes dispense 1 pr\" and 3 pr FOs She measured size 10 XWide. A: Impressions were taken for 1 pair Blue Trilam and Cork FOs with no met pads. SHe choose the \"Deborah\" in purple by Dr Mejia and they were ordered. Conf #[id number] . P: We will see the patient when ready for delivery. G: The goal is to support and protect the diabetic feet. 1 pair of FOs was requested by patient. Not 3 pair as Rx'd.     "

## 2021-06-18 NOTE — PROGRESS NOTES
[facility]. [phone]  S: Pt seen at [facility] lab eager to get the new Dr Comfort Diabetic shoes and 1 pr diabetic FOs that we measured/molded for on the last visit. O: I see that the FOs seem to fit and function well in the shoes . A: Instructions were given and seemed to be understood. Patient was satisfied. P: FU PRN.

## 2021-07-13 ENCOUNTER — RECORDS - HEALTHEAST (OUTPATIENT)
Dept: ADMINISTRATIVE | Facility: CLINIC | Age: 71
End: 2021-07-13

## 2021-07-21 DIAGNOSIS — Z11.59 ENCOUNTER FOR SCREENING FOR OTHER VIRAL DISEASES: ICD-10-CM

## 2021-10-04 RX ORDER — DULOXETIN HYDROCHLORIDE 30 MG/1
CAPSULE, DELAYED RELEASE ORAL
COMMUNITY
Start: 2021-05-19

## 2021-10-04 RX ORDER — ALLOPURINOL 300 MG/1
300 TABLET ORAL DAILY
COMMUNITY
Start: 2020-09-16

## 2021-10-04 RX ORDER — CARBOXYMETHYLCELLULOSE SODIUM 5 MG/ML
1-2 SOLUTION/ DROPS OPHTHALMIC 3 TIMES DAILY PRN
COMMUNITY

## 2021-10-04 RX ORDER — ATORVASTATIN CALCIUM 80 MG/1
80 TABLET, FILM COATED ORAL EVERY EVENING
COMMUNITY
Start: 2021-05-13

## 2021-10-04 RX ORDER — LIRAGLUTIDE 6 MG/ML
INJECTION SUBCUTANEOUS
COMMUNITY
Start: 2021-07-12

## 2021-10-04 RX ORDER — HYDROCHLOROTHIAZIDE 12.5 MG/1
12.5 CAPSULE ORAL EVERY MORNING
COMMUNITY
Start: 2020-08-18

## 2021-10-04 RX ORDER — LISINOPRIL 40 MG/1
40 TABLET ORAL DAILY
COMMUNITY
Start: 2020-08-27

## 2021-10-04 RX ORDER — ALBUTEROL SULFATE 90 UG/1
1-2 AEROSOL, METERED RESPIRATORY (INHALATION) EVERY 6 HOURS PRN
COMMUNITY
Start: 2020-08-27

## 2021-10-04 RX ORDER — AMMONIUM LACTATE 12 G/100G
CREAM TOPICAL
COMMUNITY
Start: 2021-05-19

## 2021-10-04 RX ORDER — DIPHENHYDRAMINE HCL 25 MG
50 CAPSULE ORAL
COMMUNITY

## 2021-10-04 RX ORDER — BETAMETHASONE DIPROPIONATE 0.05 %
OINTMENT (GRAM) TOPICAL
COMMUNITY
Start: 2020-01-20

## 2021-10-04 RX ORDER — MECLIZINE HYDROCHLORIDE 25 MG/1
TABLET ORAL
COMMUNITY
Start: 2020-09-19

## 2021-10-04 ASSESSMENT — MIFFLIN-ST. JEOR: SCORE: 1970.58

## 2021-10-07 ASSESSMENT — MIFFLIN-ST. JEOR: SCORE: 2015.94

## 2021-10-08 ENCOUNTER — ANESTHESIA EVENT (OUTPATIENT)
Dept: SURGERY | Facility: CLINIC | Age: 71
End: 2021-10-08
Payer: MEDICARE

## 2021-10-08 ENCOUNTER — ANESTHESIA (OUTPATIENT)
Dept: SURGERY | Facility: CLINIC | Age: 71
End: 2021-10-08
Payer: MEDICARE

## 2021-10-08 ENCOUNTER — HOSPITAL ENCOUNTER (OUTPATIENT)
Facility: CLINIC | Age: 71
Discharge: HOME | End: 2021-10-09
Attending: OTOLARYNGOLOGY | Admitting: OTOLARYNGOLOGY
Payer: MEDICARE

## 2021-10-08 DIAGNOSIS — H66.12 CHRONIC TUBOTYMPANIC SUPPURATIVE OTITIS MEDIA OF LEFT EAR: Primary | ICD-10-CM

## 2021-10-08 LAB
ATRIAL RATE - MUSE: 99 BPM
CREAT SERPL-MCNC: 1.31 MG/DL (ref 0.52–1.04)
DIASTOLIC BLOOD PRESSURE - MUSE: NORMAL MMHG
GFR SERPL CREATININE-BSD FRML MDRD: 42 ML/MIN/1.73M2
GLUCOSE BLDC GLUCOMTR-MCNC: 210 MG/DL (ref 70–99)
GLUCOSE BLDC GLUCOMTR-MCNC: 212 MG/DL (ref 70–99)
GLUCOSE BLDC GLUCOMTR-MCNC: 288 MG/DL (ref 70–99)
GLUCOSE BLDC GLUCOMTR-MCNC: 292 MG/DL (ref 70–99)
GLUCOSE BLDC GLUCOMTR-MCNC: 301 MG/DL (ref 70–99)
INTERPRETATION ECG - MUSE: NORMAL
P AXIS - MUSE: 48 DEGREES
PR INTERVAL - MUSE: 148 MS
QRS DURATION - MUSE: 90 MS
QT - MUSE: 356 MS
QTC - MUSE: 456 MS
R AXIS - MUSE: 69 DEGREES
SYSTOLIC BLOOD PRESSURE - MUSE: NORMAL MMHG
T AXIS - MUSE: 81 DEGREES
VENTRICULAR RATE- MUSE: 99 BPM

## 2021-10-08 PROCEDURE — 258N000003 HC RX IP 258 OP 636: Performed by: NURSE ANESTHETIST, CERTIFIED REGISTERED

## 2021-10-08 PROCEDURE — 250N000011 HC RX IP 250 OP 636: Performed by: NURSE ANESTHETIST, CERTIFIED REGISTERED

## 2021-10-08 PROCEDURE — 710N000009 HC RECOVERY PHASE 1, LEVEL 1, PER MIN: Performed by: OTOLARYNGOLOGY

## 2021-10-08 PROCEDURE — 36415 COLL VENOUS BLD VENIPUNCTURE: CPT | Performed by: OTOLARYNGOLOGY

## 2021-10-08 PROCEDURE — 250N000013 HC RX MED GY IP 250 OP 250 PS 637: Performed by: OTOLARYNGOLOGY

## 2021-10-08 PROCEDURE — 250N000012 HC RX MED GY IP 250 OP 636 PS 637: Performed by: PHYSICIAN ASSISTANT

## 2021-10-08 PROCEDURE — 250N000011 HC RX IP 250 OP 636: Performed by: OTOLARYNGOLOGY

## 2021-10-08 PROCEDURE — 250N000009 HC RX 250: Performed by: NURSE ANESTHETIST, CERTIFIED REGISTERED

## 2021-10-08 PROCEDURE — 272N000001 HC OR GENERAL SUPPLY STERILE: Performed by: OTOLARYNGOLOGY

## 2021-10-08 PROCEDURE — 99207 PR CONSULT E&M CHANGED TO SUBSEQUENT LEVEL: CPT | Performed by: PHYSICIAN ASSISTANT

## 2021-10-08 PROCEDURE — 360N000077 HC SURGERY LEVEL 4, PER MIN: Performed by: OTOLARYNGOLOGY

## 2021-10-08 PROCEDURE — 258N000001 HC RX 258: Performed by: OTOLARYNGOLOGY

## 2021-10-08 PROCEDURE — 82962 GLUCOSE BLOOD TEST: CPT

## 2021-10-08 PROCEDURE — 82565 ASSAY OF CREATININE: CPT | Performed by: OTOLARYNGOLOGY

## 2021-10-08 PROCEDURE — 96372 THER/PROPH/DIAG INJ SC/IM: CPT | Performed by: PHYSICIAN ASSISTANT

## 2021-10-08 PROCEDURE — 99225 PR SUBSEQUENT OBSERVATION CARE,LEVEL II: CPT | Performed by: PHYSICIAN ASSISTANT

## 2021-10-08 PROCEDURE — 258N000003 HC RX IP 258 OP 636: Performed by: OTOLARYNGOLOGY

## 2021-10-08 PROCEDURE — 370N000017 HC ANESTHESIA TECHNICAL FEE, PER MIN: Performed by: OTOLARYNGOLOGY

## 2021-10-08 PROCEDURE — 88342 IMHCHEM/IMCYTCHM 1ST ANTB: CPT | Mod: TC | Performed by: OTOLARYNGOLOGY

## 2021-10-08 PROCEDURE — 250N000009 HC RX 250: Performed by: OTOLARYNGOLOGY

## 2021-10-08 PROCEDURE — 999N000141 HC STATISTIC PRE-PROCEDURE NURSING ASSESSMENT: Performed by: OTOLARYNGOLOGY

## 2021-10-08 RX ORDER — ATORVASTATIN CALCIUM 40 MG/1
80 TABLET, FILM COATED ORAL AT BEDTIME
Status: DISCONTINUED | OUTPATIENT
Start: 2021-10-08 | End: 2021-10-09 | Stop reason: HOSPADM

## 2021-10-08 RX ORDER — LIDOCAINE HYDROCHLORIDE AND EPINEPHRINE 10; 10 MG/ML; UG/ML
INJECTION, SOLUTION INFILTRATION; PERINEURAL PRN
Status: DISCONTINUED | OUTPATIENT
Start: 2021-10-08 | End: 2021-10-08 | Stop reason: HOSPADM

## 2021-10-08 RX ORDER — GLYCOPYRROLATE 0.2 MG/ML
INJECTION, SOLUTION INTRAMUSCULAR; INTRAVENOUS PRN
Status: DISCONTINUED | OUTPATIENT
Start: 2021-10-08 | End: 2021-10-08

## 2021-10-08 RX ORDER — OXYCODONE HYDROCHLORIDE 5 MG/1
5 TABLET ORAL EVERY 4 HOURS PRN
Status: DISCONTINUED | OUTPATIENT
Start: 2021-10-08 | End: 2021-10-08

## 2021-10-08 RX ORDER — SODIUM CHLORIDE, SODIUM LACTATE, POTASSIUM CHLORIDE, CALCIUM CHLORIDE 600; 310; 30; 20 MG/100ML; MG/100ML; MG/100ML; MG/100ML
INJECTION, SOLUTION INTRAVENOUS CONTINUOUS PRN
Status: DISCONTINUED | OUTPATIENT
Start: 2021-10-08 | End: 2021-10-08

## 2021-10-08 RX ORDER — DEXAMETHASONE SODIUM PHOSPHATE 4 MG/ML
INJECTION, SOLUTION INTRA-ARTICULAR; INTRALESIONAL; INTRAMUSCULAR; INTRAVENOUS; SOFT TISSUE PRN
Status: DISCONTINUED | OUTPATIENT
Start: 2021-10-08 | End: 2021-10-08

## 2021-10-08 RX ORDER — ALLOPURINOL 300 MG/1
300 TABLET ORAL DAILY
Status: DISCONTINUED | OUTPATIENT
Start: 2021-10-08 | End: 2021-10-09 | Stop reason: HOSPADM

## 2021-10-08 RX ORDER — ACETAMINOPHEN 325 MG/1
975 TABLET ORAL EVERY 6 HOURS PRN
Status: DISPENSED | OUTPATIENT
Start: 2021-10-08 | End: 2021-10-09

## 2021-10-08 RX ORDER — DIPHENHYDRAMINE HCL 25 MG
50 CAPSULE ORAL
Status: DISCONTINUED | OUTPATIENT
Start: 2021-10-08 | End: 2021-10-09 | Stop reason: HOSPADM

## 2021-10-08 RX ORDER — HYDROCHLOROTHIAZIDE 12.5 MG/1
12.5 CAPSULE ORAL DAILY
Status: DISCONTINUED | OUTPATIENT
Start: 2021-10-08 | End: 2021-10-09 | Stop reason: HOSPADM

## 2021-10-08 RX ORDER — SODIUM CHLORIDE, SODIUM LACTATE, POTASSIUM CHLORIDE, CALCIUM CHLORIDE 600; 310; 30; 20 MG/100ML; MG/100ML; MG/100ML; MG/100ML
INJECTION, SOLUTION INTRAVENOUS CONTINUOUS
Status: DISCONTINUED | OUTPATIENT
Start: 2021-10-08 | End: 2021-10-08 | Stop reason: HOSPADM

## 2021-10-08 RX ORDER — NALOXONE HYDROCHLORIDE 0.4 MG/ML
0.2 INJECTION, SOLUTION INTRAMUSCULAR; INTRAVENOUS; SUBCUTANEOUS
Status: DISCONTINUED | OUTPATIENT
Start: 2021-10-08 | End: 2021-10-09 | Stop reason: HOSPADM

## 2021-10-08 RX ORDER — VITAMIN B COMPLEX
2000 TABLET ORAL DAILY
Status: DISCONTINUED | OUTPATIENT
Start: 2021-10-08 | End: 2021-10-09 | Stop reason: HOSPADM

## 2021-10-08 RX ORDER — HYDROMORPHONE HCL IN WATER/PF 6 MG/30 ML
0.2 PATIENT CONTROLLED ANALGESIA SYRINGE INTRAVENOUS EVERY 5 MIN PRN
Status: DISCONTINUED | OUTPATIENT
Start: 2021-10-08 | End: 2021-10-08

## 2021-10-08 RX ORDER — FENTANYL CITRATE 50 UG/ML
25 INJECTION, SOLUTION INTRAMUSCULAR; INTRAVENOUS EVERY 5 MIN PRN
Status: DISCONTINUED | OUTPATIENT
Start: 2021-10-08 | End: 2021-10-08

## 2021-10-08 RX ORDER — ONDANSETRON 2 MG/ML
4 INJECTION INTRAMUSCULAR; INTRAVENOUS EVERY 30 MIN PRN
Status: DISCONTINUED | OUTPATIENT
Start: 2021-10-08 | End: 2021-10-08

## 2021-10-08 RX ORDER — OFLOXACIN 3 MG/ML
SOLUTION AURICULAR (OTIC) PRN
Status: DISCONTINUED | OUTPATIENT
Start: 2021-10-08 | End: 2021-10-08 | Stop reason: HOSPADM

## 2021-10-08 RX ORDER — HYDROCODONE BITARTRATE AND ACETAMINOPHEN 5; 325 MG/1; MG/1
1-2 TABLET ORAL EVERY 4 HOURS PRN
Qty: 15 TABLET | Refills: 0 | Status: SHIPPED | OUTPATIENT
Start: 2021-10-08

## 2021-10-08 RX ORDER — ONDANSETRON 4 MG/1
4 TABLET, ORALLY DISINTEGRATING ORAL EVERY 30 MIN PRN
Status: DISCONTINUED | OUTPATIENT
Start: 2021-10-08 | End: 2021-10-08

## 2021-10-08 RX ORDER — CARBOXYMETHYLCELLULOSE SODIUM 5 MG/ML
1-2 SOLUTION/ DROPS OPHTHALMIC 3 TIMES DAILY PRN
Status: DISCONTINUED | OUTPATIENT
Start: 2021-10-08 | End: 2021-10-09 | Stop reason: HOSPADM

## 2021-10-08 RX ORDER — NICOTINE POLACRILEX 4 MG
15-30 LOZENGE BUCCAL
Status: DISCONTINUED | OUTPATIENT
Start: 2021-10-08 | End: 2021-10-09 | Stop reason: HOSPADM

## 2021-10-08 RX ORDER — FENTANYL CITRATE 50 UG/ML
INJECTION, SOLUTION INTRAMUSCULAR; INTRAVENOUS PRN
Status: DISCONTINUED | OUTPATIENT
Start: 2021-10-08 | End: 2021-10-08

## 2021-10-08 RX ORDER — LIDOCAINE 40 MG/G
CREAM TOPICAL
Status: DISCONTINUED | OUTPATIENT
Start: 2021-10-08 | End: 2021-10-08 | Stop reason: HOSPADM

## 2021-10-08 RX ORDER — HYDRALAZINE HYDROCHLORIDE 20 MG/ML
2.5-5 INJECTION INTRAMUSCULAR; INTRAVENOUS EVERY 10 MIN PRN
Status: DISCONTINUED | OUTPATIENT
Start: 2021-10-08 | End: 2021-10-08

## 2021-10-08 RX ORDER — DULOXETIN HYDROCHLORIDE 30 MG/1
30 CAPSULE, DELAYED RELEASE ORAL DAILY
Status: COMPLETED | OUTPATIENT
Start: 2021-10-08 | End: 2021-10-08

## 2021-10-08 RX ORDER — CLINDAMYCIN HCL 300 MG
300 CAPSULE ORAL EVERY 6 HOURS SCHEDULED
Status: DISCONTINUED | OUTPATIENT
Start: 2021-10-08 | End: 2021-10-09 | Stop reason: HOSPADM

## 2021-10-08 RX ORDER — NALOXONE HYDROCHLORIDE 0.4 MG/ML
0.4 INJECTION, SOLUTION INTRAMUSCULAR; INTRAVENOUS; SUBCUTANEOUS
Status: DISCONTINUED | OUTPATIENT
Start: 2021-10-08 | End: 2021-10-09 | Stop reason: HOSPADM

## 2021-10-08 RX ORDER — LIDOCAINE HYDROCHLORIDE 10 MG/ML
INJECTION, SOLUTION INFILTRATION; PERINEURAL PRN
Status: DISCONTINUED | OUTPATIENT
Start: 2021-10-08 | End: 2021-10-08

## 2021-10-08 RX ORDER — SODIUM CHLORIDE, SODIUM LACTATE, POTASSIUM CHLORIDE, CALCIUM CHLORIDE 600; 310; 30; 20 MG/100ML; MG/100ML; MG/100ML; MG/100ML
INJECTION, SOLUTION INTRAVENOUS CONTINUOUS
Status: DISCONTINUED | OUTPATIENT
Start: 2021-10-08 | End: 2021-10-09 | Stop reason: HOSPADM

## 2021-10-08 RX ORDER — ONDANSETRON 2 MG/ML
INJECTION INTRAMUSCULAR; INTRAVENOUS PRN
Status: DISCONTINUED | OUTPATIENT
Start: 2021-10-08 | End: 2021-10-08

## 2021-10-08 RX ORDER — CLINDAMYCIN PHOSPHATE 900 MG/50ML
900 INJECTION, SOLUTION INTRAVENOUS
Status: COMPLETED | OUTPATIENT
Start: 2021-10-08 | End: 2021-10-08

## 2021-10-08 RX ORDER — CIPROFLOXACIN AND DEXAMETHASONE 3; 1 MG/ML; MG/ML
4 SUSPENSION/ DROPS AURICULAR (OTIC) 2 TIMES DAILY
Qty: 7.5 ML | Refills: 0 | Status: SHIPPED | OUTPATIENT
Start: 2021-10-08

## 2021-10-08 RX ORDER — PROPOFOL 10 MG/ML
INJECTION, EMULSION INTRAVENOUS PRN
Status: DISCONTINUED | OUTPATIENT
Start: 2021-10-08 | End: 2021-10-08

## 2021-10-08 RX ORDER — BACITRACIN ZINC 500 [USP'U]/G
OINTMENT TOPICAL PRN
Status: DISCONTINUED | OUTPATIENT
Start: 2021-10-08 | End: 2021-10-08 | Stop reason: HOSPADM

## 2021-10-08 RX ORDER — LISINOPRIL 40 MG/1
40 TABLET ORAL DAILY
Status: DISCONTINUED | OUTPATIENT
Start: 2021-10-08 | End: 2021-10-09 | Stop reason: HOSPADM

## 2021-10-08 RX ORDER — LABETALOL HYDROCHLORIDE 5 MG/ML
10 INJECTION, SOLUTION INTRAVENOUS
Status: DISCONTINUED | OUTPATIENT
Start: 2021-10-08 | End: 2021-10-08

## 2021-10-08 RX ORDER — HYDROCODONE BITARTRATE AND ACETAMINOPHEN 5; 325 MG/1; MG/1
1 TABLET ORAL EVERY 6 HOURS PRN
Status: DISPENSED | OUTPATIENT
Start: 2021-10-08 | End: 2021-10-09

## 2021-10-08 RX ORDER — ALBUTEROL SULFATE 90 UG/1
1-2 AEROSOL, METERED RESPIRATORY (INHALATION) EVERY 6 HOURS PRN
Status: DISCONTINUED | OUTPATIENT
Start: 2021-10-08 | End: 2021-10-09 | Stop reason: HOSPADM

## 2021-10-08 RX ORDER — DEXTROSE MONOHYDRATE 25 G/50ML
25-50 INJECTION, SOLUTION INTRAVENOUS
Status: DISCONTINUED | OUTPATIENT
Start: 2021-10-08 | End: 2021-10-09 | Stop reason: HOSPADM

## 2021-10-08 RX ADMIN — SODIUM CHLORIDE, POTASSIUM CHLORIDE, SODIUM LACTATE AND CALCIUM CHLORIDE: 600; 310; 30; 20 INJECTION, SOLUTION INTRAVENOUS at 12:27

## 2021-10-08 RX ADMIN — ALLOPURINOL 300 MG: 300 TABLET ORAL at 13:12

## 2021-10-08 RX ADMIN — DEXMEDETOMIDINE 16 MCG: 100 INJECTION, SOLUTION, CONCENTRATE INTRAVENOUS at 08:54

## 2021-10-08 RX ADMIN — INSULIN ASPART 4 UNITS: 100 INJECTION, SOLUTION INTRAVENOUS; SUBCUTANEOUS at 14:05

## 2021-10-08 RX ADMIN — GLYCOPYRROLATE 0.2 MG: 0.2 INJECTION, SOLUTION INTRAMUSCULAR; INTRAVENOUS at 07:42

## 2021-10-08 RX ADMIN — Medication 140 MG: at 07:42

## 2021-10-08 RX ADMIN — PHENYLEPHRINE HYDROCHLORIDE 100 MCG: 10 INJECTION INTRAVENOUS at 07:56

## 2021-10-08 RX ADMIN — PHENYLEPHRINE HYDROCHLORIDE 200 MCG: 10 INJECTION INTRAVENOUS at 08:03

## 2021-10-08 RX ADMIN — HYDROMORPHONE HYDROCHLORIDE 0.5 MG: 1 INJECTION, SOLUTION INTRAMUSCULAR; INTRAVENOUS; SUBCUTANEOUS at 08:18

## 2021-10-08 RX ADMIN — DEXMEDETOMIDINE 20 MCG: 100 INJECTION, SOLUTION, CONCENTRATE INTRAVENOUS at 07:59

## 2021-10-08 RX ADMIN — ATORVASTATIN CALCIUM 80 MG: 40 TABLET, FILM COATED ORAL at 21:31

## 2021-10-08 RX ADMIN — PROPOFOL 200 MG: 10 INJECTION, EMULSION INTRAVENOUS at 07:42

## 2021-10-08 RX ADMIN — SODIUM CHLORIDE, POTASSIUM CHLORIDE, SODIUM LACTATE AND CALCIUM CHLORIDE: 600; 310; 30; 20 INJECTION, SOLUTION INTRAVENOUS at 07:18

## 2021-10-08 RX ADMIN — PHENYLEPHRINE HYDROCHLORIDE 0.5 MCG/KG/MIN: 10 INJECTION INTRAVENOUS at 08:06

## 2021-10-08 RX ADMIN — CLINDAMYCIN PHOSPHATE 900 MG: 900 INJECTION, SOLUTION INTRAVENOUS at 07:19

## 2021-10-08 RX ADMIN — CLINDAMYCIN HYDROCHLORIDE 300 MG: 300 CAPSULE ORAL at 17:57

## 2021-10-08 RX ADMIN — Medication 2000 UNITS: at 13:19

## 2021-10-08 RX ADMIN — ONDANSETRON HYDROCHLORIDE 4 MG: 2 INJECTION, SOLUTION INTRAVENOUS at 08:22

## 2021-10-08 RX ADMIN — FENTANYL CITRATE 100 MCG: 50 INJECTION, SOLUTION INTRAMUSCULAR; INTRAVENOUS at 07:53

## 2021-10-08 RX ADMIN — LIDOCAINE HYDROCHLORIDE 5 MG: 10 INJECTION, SOLUTION INFILTRATION; PERINEURAL at 07:41

## 2021-10-08 RX ADMIN — INSULIN DETEMIR 60 UNITS: 100 INJECTION, SOLUTION SUBCUTANEOUS at 21:32

## 2021-10-08 RX ADMIN — HYDROMORPHONE HYDROCHLORIDE 0.5 MG: 1 INJECTION, SOLUTION INTRAMUSCULAR; INTRAVENOUS; SUBCUTANEOUS at 07:55

## 2021-10-08 RX ADMIN — DEXAMETHASONE SODIUM PHOSPHATE 4 MG: 4 INJECTION, SOLUTION INTRA-ARTICULAR; INTRALESIONAL; INTRAMUSCULAR; INTRAVENOUS; SOFT TISSUE at 07:42

## 2021-10-08 RX ADMIN — PHENYLEPHRINE HYDROCHLORIDE 150 MCG: 10 INJECTION INTRAVENOUS at 08:00

## 2021-10-08 RX ADMIN — ACETAMINOPHEN 975 MG: 325 TABLET, FILM COATED ORAL at 13:11

## 2021-10-08 RX ADMIN — INSULIN ASPART 3 UNITS: 100 INJECTION, SOLUTION INTRAVENOUS; SUBCUTANEOUS at 17:29

## 2021-10-08 RX ADMIN — PHENYLEPHRINE HYDROCHLORIDE 100 MCG: 10 INJECTION INTRAVENOUS at 08:26

## 2021-10-08 RX ADMIN — DULOXETINE HYDROCHLORIDE 30 MG: 30 CAPSULE, DELAYED RELEASE ORAL at 13:12

## 2021-10-08 RX ADMIN — DEXMEDETOMIDINE 16 MCG: 100 INJECTION, SOLUTION, CONCENTRATE INTRAVENOUS at 07:47

## 2021-10-08 RX ADMIN — CLINDAMYCIN HYDROCHLORIDE 300 MG: 300 CAPSULE ORAL at 13:12

## 2021-10-08 RX ADMIN — PHENYLEPHRINE HYDROCHLORIDE 200 MCG: 10 INJECTION INTRAVENOUS at 08:06

## 2021-10-08 RX ADMIN — MIDAZOLAM 2 MG: 1 INJECTION INTRAMUSCULAR; INTRAVENOUS at 07:32

## 2021-10-08 RX ADMIN — HYDROCODONE BITARTRATE AND ACETAMINOPHEN 1 TABLET: 5; 325 TABLET ORAL at 19:30

## 2021-10-08 RX ADMIN — DEXMEDETOMIDINE 20 MCG: 100 INJECTION, SOLUTION, CONCENTRATE INTRAVENOUS at 08:18

## 2021-10-08 ASSESSMENT — MIFFLIN-ST. JEOR
SCORE: 2029.55
SCORE: 2021.84

## 2021-10-08 ASSESSMENT — LIFESTYLE VARIABLES: TOBACCO_USE: 1

## 2021-10-08 NOTE — PLAN OF CARE
"PRIMARY DIAGNOSIS: Left tympanoplasty with mastoidectomy  OUTPATIENT/OBSERVATION GOALS TO BE MET BEFORE DISCHARGE:  1. Stable vital signs YES  2. Tolerating diet: Tolerating FULL liquids  3. Pain controlled with oral pain medications: Yes  4. Positive bowel sounds:  NO  5. Voiding without difficulty: Due to void  6. Able to ambulate:  Yes, Ax1 with gait belt  7. Provider specific discharge goals met: No    Discharge Planner Nurse   Safe discharge environment identified: lives at home alone  Barriers to discharge: not once medically cleared.       Entered by: Amarilys Hurd 10/08/2021 2:43 PM     /75 (BP Location: Left arm)   Pulse 86   Temp 97.9  F (36.6  C) (Oral)   Resp 16   Ht 1.626 m (5' 4\")   Wt (!) 151.2 kg (333 lb 4.8 oz)   SpO2 96%   BMI 57.21 kg/m      Vitals stable, BP  improving. Requiring 2L O2 to keep sats WDL. Oriented x4, continues to be very drowsy. Sleeping in between cares. Ax1 with gait belt. Bed alarm on for increased safety. Reports incisional and throat pain 4/10, tylenol given. Drinking ice water. Tolerating bites of applesauce. BG elevated, insulin given. IVF infusing. Dressing dry intact, old drainage present. No new drainage noted. CMS intact. Due to void. Patient resting comfortably. Discharge meds in lock box. Will continue to monitor and provide supportive cares.       Please review provider order for any additional goals.   Nurse to notify provider when observation goals have been met and patient is ready for discharge.  "

## 2021-10-08 NOTE — ANESTHESIA CARE TRANSFER NOTE
Patient: Binta Dennis    Procedure: Procedure(s):  Left tympanoplasty with mastoidectomy       Diagnosis: Otorrhea, unspecified laterality [H92.10]  Diagnosis Additional Information: No value filed.    Anesthesia Type:   General     Note:    Oropharynx: nasal airway in place  Level of Consciousness: drowsy  Oxygen Supplementation: face mask  Level of Supplemental Oxygen (L/min / FiO2): 6  Independent Airway: airway patency satisfactory and stable  Dentition: dentition unchanged  Vital Signs Stable: post-procedure vital signs reviewed and stable  Report to RN Given: handoff report given  Patient transferred to: PACU    Handoff Report: Identifed the Patient, Identified the Reponsible Provider, Reviewed the pertinent medical history, Discussed the surgical course, Reviewed Intra-OP anesthesia mangement and issues during anesthesia, Set expectations for post-procedure period and Allowed opportunity for questions and acknowledgement of understanding      Vitals:  Vitals Value Taken Time   /83 10/08/21 0935   Temp     Pulse 94 10/08/21 0936   Resp 9 10/08/21 0936   SpO2 95 % 10/08/21 0936   Vitals shown include unvalidated device data.    Electronically Signed By: RHONDA Can CRNA  October 8, 2021  9:37 AM

## 2021-10-08 NOTE — ANESTHESIA POSTPROCEDURE EVALUATION
Patient: Binta Dennis    Procedure: Procedure(s):  Left tympanoplasty with mastoidectomy       Diagnosis:Otorrhea, unspecified laterality [H92.10]  Diagnosis Additional Information: No value filed.    Anesthesia Type:  General    Note:  Disposition: Outpatient   Postop Pain Control: Uneventful            Sign Out: Well controlled pain   PONV: No   Neuro/Psych: Uneventful            Sign Out: Acceptable/Baseline neuro status   Airway/Respiratory: Uneventful            Sign Out: Acceptable/Baseline resp. status   CV/Hemodynamics: Uneventful            Sign Out: Acceptable CV status; No obvious hypovolemia; No obvious fluid overload   Other NRE: NONE   DID A NON-ROUTINE EVENT OCCUR? No           Last vitals:  Vitals Value Taken Time   /58 10/08/21 1115   Temp 97.8  F (36.6  C) 10/08/21 1100   Pulse 90 10/08/21 1123   Resp 11 10/08/21 1123   SpO2 93 % 10/08/21 1123   Vitals shown include unvalidated device data.    Electronically Signed By: Tae Huitron MD  October 8, 2021  1:25 PM

## 2021-10-08 NOTE — PHARMACY-ADMISSION MEDICATION HISTORY
Medication reconciliation interview completed by pre-admitting nurse Latasha Wong, reviewed by pharmacy. Spoke w/ pt via phone to clarify she is still taking duloxetine (RN marked as not taking) and she has a prescription for jardiance but has not started this med yet. Added jardiance to list, removed a duplicate simvastatin, and added frequencies to some meds.   No further clarifications needed.       Prior to Admission medications    Medication Sig Last Dose Taking? Auth Provider   ACETAMINOPHEN PO Take 1,000 mg by mouth every 8 hours as needed  10/7/2021 at 2300 Yes Reported, Patient   albuterol (PROAIR HFA/PROVENTIL HFA/VENTOLIN HFA) 108 (90 Base) MCG/ACT inhaler Inhale 1-2 puffs into the lungs every 6 hours as needed  Unknown at Unknown time Yes Reported, Patient   allopurinol (ZYLOPRIM) 300 MG tablet Take 300 mg by mouth daily  10/7/2021 at 0800 Yes Reported, Patient   ammonium lactate (AMLACTIN) 12 % external cream APPLY TOPICALLY TO FEET TWICE DAILY More than a month at Unknown time Yes Reported, Patient   ARTIFICIAL TEAR OP Apply 1 drop to eye daily  10/7/2021 at 0800 Yes Reported, Patient   atorvastatin (LIPITOR) 80 MG tablet Take 80 mg by mouth every evening  10/7/2021 at 2300 Yes Reported, Patient   betamethasone dipropionate (DIPROSONE) 0.05 % external ointment  More than a month at Unknown time Yes Reported, Patient   carboxymethylcellulose (REFRESH PLUS) 0.5 % SOLN ophthalmic solution Place 1-2 drops into both eyes 3 times daily as needed  10/7/2021 at 0800 Yes Reported, Patient   diphenhydrAMINE (BENADRYL) 25 MG capsule Take 50 mg by mouth nightly as needed 10/7/2021 at 2300 Yes Reported, Patient   DULoxetine (CYMBALTA) 30 MG capsule TAKE 1 CAPSULE BY MOUTH EVERY DAY 10/7/2021 at Unknown time Yes Reported, Patient   empagliflozin (JARDIANCE) 10 MG TABS tablet Take 10 mg by mouth daily New med - hasn't started yet Yes Unknown, Entered By History   hydrochlorothiazide (MICROZIDE) 12.5 MG capsule  Take 12.5 mg by mouth every morning  10/7/2021 at 0800 Yes Reported, Patient   Insulin Detemir (LEVEMIR FLEXPEN SC) Inject 60 Units Subcutaneous 2 times daily  10/8/2021 at Unknown time Yes Reported, Patient   lisinopril (ZESTRIL) 40 MG tablet Take 40 mg by mouth daily  10/7/2021 at 0800 Yes Reported, Patient   meclizine (ANTIVERT) 25 MG tablet TAKE ONE-HALF TO 1 TABLET BY MOUTH EVERY 8 HOURS AS NEEDED FOR DIZZINESS 10/7/2021 at 0800 Yes Reported, Patient   metFORMIN (GLUCOPHAGE) 1000 MG tablet Take 1,000 mg by mouth 2 times daily (with meals) 10/7/2021 at 2300 Yes Reported, Patient   VICTOZA PEN 18 MG/3ML soln ADMINISTER 1.8 MG UNDER THE SKIN EVERY DAY 10/8/2021 at 0400 Yes Reported, Patient   VITAMIN D, CHOLECALCIFEROL, PO Take 2,000 Units by mouth daily  10/7/2021 at 0800 Yes Reported, Patient   cholecalciferol 50 MCG (2000 UT) tablet Take 4,000 Units by mouth daily  10/4/2021  Reported, Patient

## 2021-10-08 NOTE — CONSULTS
Hospitalist Consultation      Binta Dennis MRN# [id number]   YOB: 1950 Age: 71 year old   Date of Admission: 10/8/2021     Requesting Physician:  Dr. Valladares  Reason for consult: Post-op medical management, DM           Assessment and Plan:   This patient is a 71 year old female who is POD #0 s/p left tympanomastoidectomy with tympanostomy tube placement.  Overall doing well, no complaints. Pain controlled.    1. left tympanomastoidectomy with tympanostomy tube placement - pain and diet per ENT. Tolerated surgery well, denies significant pain  2. DM - managed with 60 units Levemir BID, metformin, and Victoza. Will resume those here today. She states she took 60 units of Levemir this morning. Jardiance was added recently but pt hasn't started this yet due to concerns regarding side effects. Cover with medium intensity sliding scale insulin   3. HTN - resume hydrochlorothiazide and lisinopril with parameters   4. HLP - resume statin  5. ELI - uses CPAP  6. Depression - resume home Cymbalta                History of Present Illness:   This patient is a 71 year old female who is POD #0 s/p left tympanomastoidectomy with tympanostomy tube placement. She has chronic left otitis media and has failed conservative outpatient management so presents here for elective left tympanomastoidectomy with tympanostomy tube placement. She tolerated the surgery well, pain is well controlled, has had adequate I&Os, and she is tolerating clear liquids. She is due to void. She denies fever, chills, chest pain, SOB, cough, abdominal pain, nausea, vomiting, or diarrhea. She also has a PMHx of DM, HTN, HLP, ELI and depression.              Past Medical History:     Past Medical History:   Diagnosis Date     Colon polyps      CPAP (continuous positive airway pressure) dependence      Depression      Droopy eyelid, bilateral      Gout      Hx of neck surgery     disk repair     Hyperlipidaemia      Hypertension      IDDM  (insulin dependent diabetes mellitus)      ELI (obstructive sleep apnea)     uses CPAP     Osteoarthritis      Psoriasis      Type 2 diabetes mellitus (H)     insulin user               Past Surgical History:     Past Surgical History:   Procedure Laterality Date     BACK SURGERY      disk removal     BACK SURGERY       C FERGUSON W/O FACETEC FORAMOT/[facility] 1/2 VRT SEG, CERVICAL      Description: Laminectomy Lumbar;  Recorded: 05/20/2010;     C TOTAL KNEE ARTHROPLASTY Left 10/24/2016    Procedure: LEFT TOTAL KNEE  ARTHROPLASTY;  Surgeon: Joseluis Kim MD;  Location: [facility] OR;  Service: Orthopedics     C TOTAL KNEE ARTHROPLASTY Right 8/14/2017    Procedure: RIGHT TOTAL KNEE ARTHROPLASTY;  Surgeon: Joseluis Kim MD;  Location: [facility] OR;  Service: Orthopedics     CATARACT IOL, RT/LT Right 8-17-15     DECOMPRESSION CERVICAL MINIMALLY INVASIVE ONE LEVEL      levels unknown     EYE SURGERY       EYE SURGERY      cat     HC REVISE MEDIAN N/CARPAL TUNNEL SURG      Description: Neuroplasty Decompression Median Nerve At Carpal Tunnel;  Recorded: 05/20/2010;  Comments: s/p Bilateral CTS Surgery (1990)     HEAD & NECK SURGERY      Cerv. surg. [facility] less than 10 years ago as of 2021.     KNEE SURGERY Bilateral      lumbar disectomy       ORTHOPEDIC SURGERY       PHACOEMULSIFICATION CLEAR CORNEA WITH STANDARD INTRAOCULAR LENS IMPLANT Right 8/17/2015    Procedure: PHACOEMULSIFICATION CLEAR CORNEA WITH STANDARD INTRAOCULAR LENS IMPLANT;  Surgeon: Addy Razo MD;  Location:  [facility]     PHACOEMULSIFICATION CLEAR CORNEA WITH STANDARD INTRAOCULAR LENS IMPLANT Left 5/16/2016    Procedure: PHACOEMULSIFICATION CLEAR CORNEA WITH STANDARD INTRAOCULAR LENS IMPLANT;  Surgeon: Addy Razo MD;  Location:  [facility]     REPAIR PTOSIS BILATERAL Bilateral 9/21/2018    Procedure: REPAIR PTOSIS BILATERAL;  BILATERAL UPPER LID PTOSIS REPAIR ;  Surgeon: Michelle Garner MD;  Location: [facility]     STRABISMUS  SURGERY Bilateral      TOE SURGERY       TONSILLECTOMY       TONSILLECTOMY                   Social History:     Social History     Tobacco Use     Smoking status: Former Smoker     Packs/day: 2.00     Years: 40.00     Pack years: 80.00     Types: Cigarettes     Quit date: 2010     Years since quittin.1     Smokeless tobacco: Never Used   Substance Use Topics     Alcohol use: Yes     Comment: 1-2x's/month if that.     Drug use: No                 Family History:   I have reviewed this patient's family history  family history includes Glaucoma in her maternal grandmother; Nystagmus in her mother.            Allergies:     Allergies   Allergen Reactions     Mold      Sinus irritation.     Mosquitoes (Informational Only) Other (See Comments)     Welts, (larger than typical reaction) Lasting 2-3 days.     Pcn [Penicillins] Itching     Seasonal Allergies      Sinus irritations             Medications:     Prior to Admission medications    Medication Sig Last Dose Taking? Auth Provider   ACETAMINOPHEN PO Take 1,000 mg by mouth every 8 hours as needed  10/7/2021 at 2300 Yes Reported, Patient   albuterol (PROAIR HFA/PROVENTIL HFA/VENTOLIN HFA) 108 (90 Base) MCG/ACT inhaler Inhale 1-2 puffs into the lungs every 6 hours as needed  Unknown at Unknown time Yes Reported, Patient   allopurinol (ZYLOPRIM) 300 MG tablet Take 300 mg by mouth daily  10/7/2021 at 0800 Yes Reported, Patient   ammonium lactate (AMLACTIN) 12 % external cream APPLY TOPICALLY TO FEET TWICE DAILY More than a month at Unknown time Yes Reported, Patient   ARTIFICIAL TEAR OP Apply 1 drop to eye daily  10/7/2021 at 0800 Yes Reported, Patient   atorvastatin (LIPITOR) 80 MG tablet Take 80 mg by mouth every evening  10/7/2021 at 2300 Yes Reported, Patient   betamethasone dipropionate (DIPROSONE) 0.05 % external ointment  More than a month at Unknown time Yes Reported, Patient   carboxymethylcellulose (REFRESH PLUS) 0.5 % SOLN ophthalmic solution Place  1-2 drops into both eyes 3 times daily as needed  10/7/2021 at 0800 Yes Reported, Patient   ciprofloxacin-dexamethasone (CIPRODEX) 0.3-0.1 % otic suspension Place 4 drops Into the left ear 2 times daily  Yes Service, Juan Scott MD   diphenhydrAMINE (BENADRYL) 25 MG capsule Take 50 mg by mouth nightly as needed 10/7/2021 at 2300 Yes Reported, Patient   DULoxetine (CYMBALTA) 30 MG capsule TAKE 1 CAPSULE BY MOUTH EVERY DAY 10/7/2021 at Unknown time Yes Reported, Patient   empagliflozin (JARDIANCE) 10 MG TABS tablet Take 10 mg by mouth daily New med - hasn't started yet Yes Unknown, Entered By History   hydrochlorothiazide (MICROZIDE) 12.5 MG capsule Take 12.5 mg by mouth every morning  10/7/2021 at 0800 Yes Reported, Patient   HYDROcodone-acetaminophen (NORCO) 5-325 MG tablet Take 1-2 tablets by mouth every 4 hours as needed for moderate to severe pain  Yes Service, Juan Scott MD   Insulin Detemir (LEVEMIR FLEXPEN SC) Inject 60 Units Subcutaneous 2 times daily  10/8/2021 at Unknown time Yes Reported, Patient   lisinopril (ZESTRIL) 40 MG tablet Take 40 mg by mouth daily  10/7/2021 at 0800 Yes Reported, Patient   meclizine (ANTIVERT) 25 MG tablet TAKE ONE-HALF TO 1 TABLET BY MOUTH EVERY 8 HOURS AS NEEDED FOR DIZZINESS 10/7/2021 at 0800 Yes Reported, Patient   metFORMIN (GLUCOPHAGE) 1000 MG tablet Take 1,000 mg by mouth 2 times daily (with meals) 10/7/2021 at 2300 Yes Reported, Patient   VICTOZA PEN 18 MG/3ML soln ADMINISTER 1.8 MG UNDER THE SKIN EVERY DAY 10/8/2021 at 0400 Yes Reported, Patient   VITAMIN D, CHOLECALCIFEROL, PO Take 2,000 Units by mouth daily  10/7/2021 at 0800 Yes Reported, Patient   cholecalciferol 50 MCG (2000 UT) tablet Take 4,000 Units by mouth daily  10/4/2021  Reported, Patient               Review of Systems:   A comprehensive greater than 10 system review of systems was carried out.  Pertinent positives and negatives are noted above.  Otherwise negative for contributory info.             "Physical Exam:   Vitals were reviewed  Blood pressure 127/75, pulse 86, temperature 97.9  F (36.6  C), temperature source Oral, resp. rate 16, height 1.626 m (5' 4\"), weight (!) 151.2 kg (333 lb 4.8 oz), SpO2 96 %.  Exam:    GENERAL:  Comfortable.  PSYCH: pleasant, oriented, No acute distress.  HEENT:  Normal conjunctiva, nasal mucosa and Oropharynx are normal. Dressing over left ear  NECK:  Supple, no neck vein distention  HEART:  Normal S1, S2 with no murmur, no pericardial rub, S3 or S4.  LUNGS:  Clear to auscultation, normal Respiratory effort.  GI:  Soft, normal bowel sounds.  EXTREMITIES:  No pedal edema, +2 pulses bilateral and equal.  SKIN:  Dry to touch, No rash, wound or ulcerations.  NEUROLOGIC:  Nonfocal with normal cranial nerve and motor power and sensation.            Data:   Past 24 hours labs, studies, and imaging were reviewed.  Recent Labs   Lab 10/08/21  1326 10/08/21  0939 10/08/21  0535   * 212* 210*       Ivette Dinero PA-C    Pt discussed with Dr. Sánchez who agrees with the care as discussed above.       "

## 2021-10-08 NOTE — PROGRESS NOTES
.ROOM # [id number]    Living Situation (if not independent, order SW consult): home alone  Facility name: NA  : lesia martin     Activity level at baseline: indep with cane  Activity level on admit: Ax1      Patient registered to observation; given Patient Bill of Rights; given the opportunity to ask questions about observation status and their plan of care.  Patient has been oriented to the observation room, bathroom and call light is in place.    Discussed discharge goals and expectations with patient/family.

## 2021-10-08 NOTE — ANESTHESIA PREPROCEDURE EVALUATION
Anesthesia Pre-Procedure Evaluation    Patient: Binta Dennis   MRN: [id number] : 1950        Preoperative Diagnosis: Otorrhea, unspecified laterality [H92.10]    Procedure : Procedure(s):  Left tympanoplasty with mastoidectomy          Past Medical History:   Diagnosis Date     Colon polyps      CPAP (continuous positive airway pressure) dependence      Depression      Diabetes (H)      Droopy eyelid, bilateral      Gout      Hx of neck surgery     disk repair     Hyperlipidaemia      Hypertension      Hypertension      IDDM (insulin dependent diabetes mellitus)      ELI (obstructive sleep apnea)      Osteoarthritis      Psoriasis      Sleep apnea     uses C-Pap     Type 2 diabetes mellitus (H)     insulin user      Past Surgical History:   Procedure Laterality Date     BACK SURGERY      disk removal     BACK SURGERY       C FERGUSON W/O [facility]ACETEC FORAMOT/[facility]  VRT SEG, CERVICAL      Description: Laminectomy Lumbar;  Recorded: 2010;     C TOTAL KNEE ARTHROPLASTY Left 10/24/2016    Procedure: LEFT TOTAL KNEE  ARTHROPLASTY;  Surgeon: Joseluis Kim MD;  Location: [facility] OR;  Service: Orthopedics     C TOTAL KNEE ARTHROPLASTY Right 2017    Procedure: RIGHT TOTAL KNEE ARTHROPLASTY;  Surgeon: Joseluis Kim MD;  Location: [facility] OR;  Service: Orthopedics     CATARACT IOL, RT/LT Right 8-17-15     DECOMPRESSION CERVICAL MINIMALLY INVASIVE ONE LEVEL      levels unknown     EYE SURGERY       EYE SURGERY      cat     HC REVISE MEDIAN N/CARPAL TUNNEL SURG      Description: Neuroplasty Decompression Median Nerve At Carpal Tunnel;  Recorded: 2010;  Comments: s/p Bilateral CTS Surgery ()     HEAD & NECK SURGERY      Cerv. surg. [facility] less than 10 years ago as of .     KNEE SURGERY Bilateral      lumbar disectomy       ORTHOPEDIC SURGERY       PHACOEMULSIFICATION CLEAR CORNEA WITH STANDARD INTRAOCULAR LENS IMPLANT Right 2015    Procedure: PHACOEMULSIFICATION CLEAR  CORNEA WITH STANDARD INTRAOCULAR LENS IMPLANT;  Surgeon: Addy Razo MD;  Location:  [facility]     PHACOEMULSIFICATION CLEAR CORNEA WITH STANDARD INTRAOCULAR LENS IMPLANT Left 2016    Procedure: PHACOEMULSIFICATION CLEAR CORNEA WITH STANDARD INTRAOCULAR LENS IMPLANT;  Surgeon: Addy Razo MD;  Location: [facility]     REPAIR PTOSIS BILATERAL Bilateral 2018    Procedure: REPAIR PTOSIS BILATERAL;  BILATERAL UPPER LID PTOSIS REPAIR ;  Surgeon: Michelle Garner MD;  Location:  [facility]     STRABISMUS SURGERY Bilateral      TOE SURGERY       TONSILLECTOMY       TONSILLECTOMY        Allergies   Allergen Reactions     Mold      Sinus irritation.     Mosquitoes (Informational Only) Other (See Comments)     Welts, (larger than typical reaction) Lasting 2-3 days.     Pcn [Penicillins] Itching     Seasonal Allergies      Sinus irritations      Social History     Tobacco Use     Smoking status: Former Smoker     Packs/day: 2.00     Years: 40.00     Pack years: 80.00     Types: Cigarettes     Quit date: 2010     Years since quittin.1     Smokeless tobacco: Never Used   Substance Use Topics     Alcohol use: Yes     Comment: 1-2x's/month if that.      Wt Readings from Last 1 Encounters:   10/08/21 (!) 152 kg (335 lb)        Anesthesia Evaluation   Pt has had prior anesthetic.     History of anesthetic complications       ROS/MED HX  ENT/Pulmonary:     (+) sleep apnea, uses CPAP, tobacco use, Past use,     Neurologic: Comment: C6 radiculopathy      Cardiovascular:     (+) Dyslipidemia hypertension-----    METS/Exercise Tolerance:     Hematologic:       Musculoskeletal:   (+) arthritis,     GI/Hepatic:     (+) liver disease,     Renal/Genitourinary: Comment: gout    (+) renal disease, type: CRI,     Endo:     (+) type II DM, Obesity,     Psychiatric/Substance Use:     (+) psychiatric history depression     Infectious Disease: Comment: yeast      Malignancy:       Other:            Physical Exam    Airway         Mallampati: II   TM distance: > 3 FB   Neck ROM: full   Mouth opening: > 3 cm    Respiratory Devices and Support         Dental           Cardiovascular             Pulmonary                   OUTSIDE LABS:  CBC: No results found for: WBC, HGB, HCT, PLT  BMP:   Lab Results   Component Value Date    CR 1.41 (H) 08/14/2017    CR 1.09 07/03/2017     (H) 10/08/2021     COAGS: No results found for: PTT, INR, FIBR  POC:   Lab Results   Component Value Date     (H) 09/21/2018     HEPATIC: No results found for: ALBUMIN, PROTTOTAL, ALT, AST, GGT, ALKPHOS, BILITOTAL, BILIDIRECT, KARMA  OTHER:   Lab Results   Component Value Date    A1C 9.6 (H) 07/02/2017       Anesthesia Plan    ASA Status:  3   NPO Status:  NPO Appropriate    Anesthesia Type: General.     - Airway: ETT   Induction: Intravenous, Propofol.   Maintenance: Balanced.   Techniques and Equipment:     - Airway: Video-Laryngoscope         Consents    Anesthesia Plan(s) and associated risks, benefits, and realistic alternatives discussed. Questions answered and patient/representative(s) expressed understanding.     - Discussed with:  Patient         Postoperative Care    Pain management: IV analgesics, Oral pain medications.   PONV prophylaxis: Ondansetron (or other 5HT-3), Dexamethasone or Solumedrol     Comments:                Tae Huitron MD

## 2021-10-08 NOTE — ANESTHESIA PROCEDURE NOTES
Airway       Patient location during procedure: OR       Procedure Start/Stop Times: 10/8/2021 7:43 AM  Staff -        CRNA: Lucy Rodriguez APRN CRNA       Performed By: CRNA  Consent for Airway        Urgency: elective  Indications and Patient Condition       Indications for airway management: zelalem-procedural       Induction type:RSI       Mask difficulty assessment: 0 - not attempted    Final Airway Details       Final airway type: endotracheal airway       Successful airway: ETT - single and Oral  Endotracheal Airway Details        ETT size (mm): 7.0       Cuffed: yes       Successful intubation technique: video laryngoscopy       VL Blade Size: Glidescope 3       Grade View of Cords: 1 (zero DL view, redundant tissue falling over glottic opening deeply hooded glottis)       Adjucts: stylet       Position: Right       Measured from: gums/teeth       Secured at (cm): 22       Bite block used: Soft    Post intubation assessment        Placement verified by: capnometry, equal breath sounds and chest rise        Number of attempts at approach: 1       Secured with: plastic tape       Ease of procedure: easy       Dentition: Intact and Unchanged

## 2021-10-08 NOTE — OP NOTE
DATE OF SERVICE: 10/8/2021     PREOPERATIVE DIAGNOSES:   1. Chronic left otitis media     POSTOPERATIVE DIAGNOSES:   1. Chronic left otitis media       PROCEDURE:Left tympanomastoidectomy with tympanostomy tube placement.     INDICATIONS:Treat disease and improve hearing.     ANESTHESIA: General.     SURGEON: Juan Valladares M.D.     ASSISTANT: None.     FINDINGS:  1.  Granulation polyp at base of tympanostomy tube - removed  2.  Adhesions in the sinus tympani  3.  Polyp at antrum.      BLOOD LOSS: 5 mL.     COMPLICATIONS: None.     SPECIMENS: Left mastoid contents.     Under excellent general endotracheal tube anesthesia, the patient was turned 180 degrees. Facial nerve monitors were placedin the ipsilateral orbicularis oris and orbicularis oculi muscles and a baseline electrophysiologic study was performed and within normal limits. The left ear was prepped and draped in the usual sterile fashion. Itwas examined under the microscope and cleaned. This showed granulation at the base of the tympanostomy tube.  This is debrided with a small myringotomy present.  A 4 quadrant injection of 1% lidocaine with epinephrine was performed with excellent blanching of the external auditory canal skin.  A tympanomeatal flap was elevated preserving the chorda tympani.  Some adhesions around the incudostapedial joint but no significant middle ear disease. A postauricular incision was made, soft tissues were divided down to the face of the mastoid and the ear was turned forward.  A mastoidotomy was performed with a #5 cutting bur and subsequently smaller cutting and alber burs until the antrum was identified. There was no significant mastoid disease with the exception of a poylp nearly obstructing the antrum to the attic. This was debrided and sent for pathology.  It was apparent there was good communication from the mastoid to the middle ear after removal of the polyp.  At this point I brought the tympanomeatal flap down and  performed a myringotomy in the anterior drum.  A modified sánchez T-tube was placed.  I put some Cipro dex soaked gelfoam in the antrum and in the medial canal. The postauricular incision was closed with 3-0Vicryl stitches and Steri-Strips. The remaining external auditory canal was packed with Floxin-soaked Gelfoam. A mastoid dressing was placed and the patient was turned over to Anesthesia for recovery.     ANUEL RIGGINS MD

## 2021-10-09 VITALS
HEART RATE: 92 BPM | WEIGHT: 293 LBS | SYSTOLIC BLOOD PRESSURE: 115 MMHG | RESPIRATION RATE: 16 BRPM | HEIGHT: 64 IN | DIASTOLIC BLOOD PRESSURE: 58 MMHG | OXYGEN SATURATION: 92 % | TEMPERATURE: 98.3 F | BODY MASS INDEX: 50.02 KG/M2

## 2021-10-09 PROBLEM — H66.12 CHRONIC TUBOTYMPANIC SUPPURATIVE OTITIS MEDIA OF LEFT EAR: Status: ACTIVE | Noted: 2021-10-09

## 2021-10-09 LAB
GLUCOSE BLDC GLUCOMTR-MCNC: 238 MG/DL (ref 70–99)
GLUCOSE BLDC GLUCOMTR-MCNC: 250 MG/DL (ref 70–99)
GLUCOSE BLDC GLUCOMTR-MCNC: 264 MG/DL (ref 70–99)

## 2021-10-09 PROCEDURE — 99207 PR CDG-CODE CATEGORY CHANGED: CPT | Performed by: PHYSICIAN ASSISTANT

## 2021-10-09 PROCEDURE — 250N000013 HC RX MED GY IP 250 OP 250 PS 637: Performed by: PHYSICIAN ASSISTANT

## 2021-10-09 PROCEDURE — 82962 GLUCOSE BLOOD TEST: CPT

## 2021-10-09 PROCEDURE — 99225 PR SUBSEQUENT OBSERVATION CARE,LEVEL II: CPT | Performed by: PHYSICIAN ASSISTANT

## 2021-10-09 PROCEDURE — 250N000013 HC RX MED GY IP 250 OP 250 PS 637: Performed by: OTOLARYNGOLOGY

## 2021-10-09 RX ADMIN — Medication 2000 UNITS: at 09:13

## 2021-10-09 RX ADMIN — CLINDAMYCIN HYDROCHLORIDE 300 MG: 300 CAPSULE ORAL at 12:32

## 2021-10-09 RX ADMIN — ALLOPURINOL 300 MG: 300 TABLET ORAL at 09:14

## 2021-10-09 RX ADMIN — HYDROCODONE BITARTRATE AND ACETAMINOPHEN 1 TABLET: 5; 325 TABLET ORAL at 06:11

## 2021-10-09 RX ADMIN — INSULIN ASPART 2 UNITS: 100 INJECTION, SOLUTION INTRAVENOUS; SUBCUTANEOUS at 12:31

## 2021-10-09 RX ADMIN — CLINDAMYCIN HYDROCHLORIDE 300 MG: 300 CAPSULE ORAL at 00:17

## 2021-10-09 RX ADMIN — CLINDAMYCIN HYDROCHLORIDE 300 MG: 300 CAPSULE ORAL at 06:11

## 2021-10-09 RX ADMIN — METFORMIN HYDROCHLORIDE 1000 MG: 500 TABLET, FILM COATED ORAL at 09:14

## 2021-10-09 RX ADMIN — INSULIN ASPART 3 UNITS: 100 INJECTION, SOLUTION INTRAVENOUS; SUBCUTANEOUS at 09:14

## 2021-10-09 RX ADMIN — INSULIN DETEMIR 60 UNITS: 100 INJECTION, SOLUTION SUBCUTANEOUS at 09:24

## 2021-10-09 NOTE — PLAN OF CARE
"PRIMARY DIAGNOSIS: Left tympanoplasty with mastoidectomy  OUTPATIENT/OBSERVATION GOALS TO BE MET BEFORE DISCHARGE:  1. Stable vital signs: Yes.  2. Tolerating diet: Tolerating 60g CHO diet.  3. Pain controlled with oral pain medications: Yes, has not requested pain meds from 2300 until now.  4. Positive bowel sounds:  Yes, hypoactive.  5. Voiding without difficulty: Yes.  6. Able to ambulate:  Yes, Ax1 with gait belt.  7. Provider specific discharge goals met: No, still needs Ax1 due to equipment.    Discharge Planner Nurse   Safe discharge environment identified: lives at home alone  Barriers to discharge: yes       Entered by: Ralph Cobos 10/09/2021 5:26 AM      Patient is A/Ox4 and is Ax1 with gait belt and cane. 60g CHO diet. 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 VSS. /63 (BP Location: Left arm)   Pulse 96   Temp 98.2  F (36.8  C) (Oral)   Resp 20   Ht 1.626 m (5' 4\")   Wt (!) 151.2 kg (333 lb 4.8 oz)   SpO2 97%   BMI 57.21 kg/m   Patient on 2L NC. Oral cleocin every 6 hours. Discharge likely tomorrow. Discharge meds in lock box. Will continue to monitor.     Please review provider order for any additional goals.   Nurse to notify provider when observation goals have been met and patient is ready for discharge.  "

## 2021-10-09 NOTE — PLAN OF CARE
"PRIMARY DIAGNOSIS: Left tympanoplasty with mastoidectomy  OUTPATIENT/OBSERVATION GOALS TO BE MET BEFORE DISCHARGE:  1. Stable vital signs YES, except requiring 1L O2 to keep sats WDL  2. Tolerating diet: Yes, tolerated regular diet  3. Pain controlled with oral pain medications: Yes  4. Positive bowel sounds: Yes  5. Voiding without difficulty: Yes  6. Able to ambulate:  Yes, Ax1 with gait belt  7. Provider specific discharge goals met: No     Discharge Planner Nurse   Safe discharge environment identified: lives at home alone  Barriers to discharge: not once medically cleared.       Entered by: Amarilys Hurd     /54 (BP Location: Left arm)   Pulse 95   Temp 98.1  F (36.7  C) (Oral)   Resp 18   Ht 1.626 m (5' 4\")   Wt (!) 151.2 kg (333 lb 4.8 oz)   SpO2 92%   BMI 57.21 kg/m          Vitals stable, BP  improving. Requiring 1L O2 to keep sats WDL. Alert and oriented x4. Ax1 with gait belt. Bed alarm on for increased safety. Reports incisional pain is well controlled on norco. Tolerating regular diet. BG elevated, insulin given. IVF infusing. Dressing open to air, scant drainage. CMS intact. Voiding without difficulty. Patient resting comfortably. Discharge meds in lock box. Plan for discharge once O2 is weaned. Will continue to monitor and provide supportive cares.         Please review provider order for any additional goals.   Nurse to notify provider when observation goals have been met and patient is ready for discharge.  "

## 2021-10-09 NOTE — PLAN OF CARE
"PRIMARY DIAGNOSIS: Left tympanoplasty with mastoidectomy  OUTPATIENT/OBSERVATION GOALS TO BE MET BEFORE DISCHARGE:  1. Stable vital signs: Yes.  2. Tolerating diet: Tolerating 60g CHO diet.  3. Pain controlled with oral pain medications: Yes, has not requested pain meds from 2300 until now.  4. Positive bowel sounds:  Yes, hypoactive.  5. Voiding without difficulty: Yes.  6. Able to ambulate:  Yes, Ax1 with gait belt. Patients movement has improved throughout the night.  7. Provider specific discharge goals met: No, still needs Ax1 due to equipment.    Discharge Planner Nurse   Safe discharge environment identified: lives at home alone  Barriers to discharge: yes       Entered by: Ralph Cobos 10/09/2021 5:59 AM      Patient is A/Ox4 and is Ax1 with gait belt and cane. 60g CHO diet. 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 VSS. /63 (BP Location: Left arm)   Pulse 96   Temp 98.2  F (36.8  C) (Oral)   Resp 20   Ht 1.626 m (5' 4\")   Wt (!) 151.2 kg (333 lb 4.8 oz)   SpO2 97%   BMI 57.21 kg/m   Patient on 2L NC. Oral cleocin every 6 hours. Discharge likely today. Discharge meds in lock box. Will continue to monitor.     Please review provider order for any additional goals.   Nurse to notify provider when observation goals have been met and patient is ready for discharge.  "

## 2021-10-09 NOTE — PROGRESS NOTES
[facility]    Medicine Progress Note - Hospitalist Service       Date of Admission:  10/8/2021    Assessment & Plan           Binta Dennis is a 71 year old female with a PMHx of DM, HTN, HLP, ELI and depression, who was admitted post-op on 10/8/2021 after left tympanomastoidectomy with tympanostomy tube placement with Dr. Valladares. She is POD#1. Required O2 overnight.     1. Left tympanomastoidectomy with tympanostomy tube placement - pain and diet per ENT. Tolerated surgery well, denies significant pain  2. DM - continue home 60 units Levemir BID, metformin, and Victoza. Jardiance was added recently but pt hasn't started this yet due to concerns regarding side effects. Continue medium intensity sliding scale insulin   3. HTN - continue hydrochlorothiazide and lisinopril with parameters   4. HLP - continue statin  5. ELI - uses CPAP. Required O2 overnight, wean today.  6. Depression - continue home Cymbalta       Diet: Consistent Carbohydrate Diet Consistent Carb 60 grams CHO per Meal Diet    DVT Prophylaxis: Defer to primary service  Aguirre Catheter: Not present  Central Lines: None  Code Status:   full code    Disposition Plan   Expected discharge: 10/09/2021   recommended to prior living arrangement once cleared by ENT.     The patient's care was discussed with the Bedside Nurse and Patient.    Ivette Dinero PA-C  Hospitalist Service  [facility]  Securely message with the Vocera Web Console (learn more here)  Text page via [employer] Paging/Directory      Clinically Significant Risk Factors Present on Admission                   ______________________________________________________________________    Interval History   Doing well, no specific complaints today. Tolerating oral intake    Data reviewed today: I reviewed all medications, new labs and imaging results over the last 24 hours. I personally reviewed no images or EKG's today.    Physical Exam   Vital Signs:  Temp: 98.1  F (36.7  C) Temp src: Oral BP: 118/54 Pulse: 95   Resp: 18 SpO2: 92 % O2 Device: Nasal cannula Oxygen Delivery: 2 LPM  Weight: 333 lbs 4.8 oz    GENERAL:  Comfortable.  PSYCH: pleasant, oriented, No acute distress.  HEENT:  Atraumatic, normocephalic. Packing in left ear is saturated.  HEART:  Normal S1, S2 with no murmur, no pericardial rub, gallops or S3 or S4.  LUNGS:  Clear to auscultation, normal Respiratory effort. No wheezing, rales or ronchi.  GI:  Soft, normal bowel sounds. Non-tender, non distended.   EXTREMITIES:  Able to ambulate independently   SKIN:  Dry to touch, No rash, wound or ulcerations.  NEUROLOGIC:  Grossly intact    Data   Recent Labs   Lab 10/09/21  1148 10/09/21  0651 10/09/21  0230 10/08/21  1652 10/08/21  1543   CR  --   --   --   --  1.31*   * 264* 250*   < >  --     < > = values in this interval not displayed.     No results found for this or any previous visit (from the past 24 hour(s)).  Medications     lactated ringers Stopped (10/08/21 1943)       allopurinol  300 mg Oral Daily     atorvastatin  80 mg Oral At Bedtime     clindamycin  300 mg Oral Q6H CRISPIN     hydrochlorothiazide  12.5 mg Oral Daily     insulin aspart  1-7 Units Subcutaneous TID AC     insulin aspart  1-5 Units Subcutaneous At Bedtime     insulin detemir  60 Units Subcutaneous BID     lisinopril  40 mg Oral Daily     metFORMIN  1,000 mg Oral BID w/meals     Vitamin D3  2,000 Units Oral Daily

## 2021-10-09 NOTE — PLAN OF CARE
Patient's After Visit Summary was reviewed with patient and sister: YES  Patient verbalized understanding of After Visit Summary, recommended follow up and was given an opportunity to ask questions.   Discharge medications sent home with patient/family: YES  Discharged with sister    OBSERVATION patient END time: 1333

## 2021-10-09 NOTE — PLAN OF CARE
"PRIMARY DIAGNOSIS: Left tympanoplasty with mastoidectomy  OUTPATIENT/OBSERVATION GOALS TO BE MET BEFORE DISCHARGE:  1. Stable vital signs: Yes.  2. Tolerating diet: Tolerating 60g CHO diet.  3. Pain controlled with oral pain medications: Yes, has not requested pain meds from 2300 until now.  4. Positive bowel sounds:  Yes, hypoactive.  5. Voiding without difficulty: Yes.  6. Able to ambulate:  Yes, Ax1 with gait belt.  7. Provider specific discharge goals met: No, still needs Ax1 due to equipment.    Discharge Planner Nurse   Safe discharge environment identified: lives at home alone  Barriers to discharge: yes       Entered by: Ralph Cobos 10/09/2021 5:57 AM      Patient is A/Ox4 and is Ax1 with gait belt and cane. 60g CHO diet. 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 VSS. /63 (BP Location: Left arm)   Pulse 96   Temp 98.2  F (36.8  C) (Oral)   Resp 20   Ht 1.626 m (5' 4\")   Wt (!) 151.2 kg (333 lb 4.8 oz)   SpO2 97%   BMI 57.21 kg/m   Patient on 2L NC. Oral cleocin every 6 hours. Discharge likely today. Discharge meds in lock box. Will continue to monitor.     Please review provider order for any additional goals.   Nurse to notify provider when observation goals have been met and patient is ready for discharge.  "

## 2021-10-13 LAB
PATH REPORT.COMMENTS IMP SPEC: NORMAL
PATH REPORT.COMMENTS IMP SPEC: NORMAL
PATH REPORT.FINAL DX SPEC: NORMAL
PATH REPORT.GROSS SPEC: NORMAL
PATH REPORT.MICROSCOPIC SPEC OTHER STN: NORMAL
PHOTO IMAGE: NORMAL

## 2021-10-13 PROCEDURE — 88342 IMHCHEM/IMCYTCHM 1ST ANTB: CPT | Mod: 26 | Performed by: SPECIALIST

## 2021-10-13 PROCEDURE — 88304 TISSUE EXAM BY PATHOLOGIST: CPT | Mod: 26 | Performed by: SPECIALIST

## 2022-03-21 ENCOUNTER — LAB REQUISITION (OUTPATIENT)
Dept: LAB | Facility: CLINIC | Age: 72
End: 2022-03-21
Payer: COMMERCIAL

## 2022-03-21 DIAGNOSIS — D41.9 NEOPLASM OF UNCERTAIN BEHAVIOR OF UNSPECIFIED URINARY ORGAN: ICD-10-CM

## 2022-03-22 LAB
ANION GAP SERPL CALCULATED.3IONS-SCNC: 11 MMOL/L (ref 5–18)
BUN SERPL-MCNC: 10 MG/DL (ref 8–22)
CALCIUM SERPL-MCNC: 9.6 MG/DL (ref 8.5–10.5)
CHLORIDE BLD-SCNC: 96 MMOL/L (ref 98–107)
CO2 SERPL-SCNC: 29 MMOL/L (ref 22–31)
CREAT SERPL-MCNC: 1.22 MG/DL (ref 0.6–1.1)
ERYTHROCYTE [DISTWIDTH] IN BLOOD BY AUTOMATED COUNT: 15.1 % (ref 10–15)
GFR SERPL CREATININE-BSD FRML MDRD: 48 ML/MIN/1.73M2
GLUCOSE BLD-MCNC: 100 MG/DL (ref 70–125)
HCT VFR BLD AUTO: 27.8 % (ref 35–47)
HGB BLD-MCNC: 8.6 G/DL (ref 11.7–15.7)
MCH RBC QN AUTO: 32.1 PG (ref 26.5–33)
MCHC RBC AUTO-ENTMCNC: 30.9 G/DL (ref 31.5–36.5)
MCV RBC AUTO: 104 FL (ref 78–100)
PLATELET # BLD AUTO: 354 10E3/UL (ref 150–450)
POTASSIUM BLD-SCNC: 3.9 MMOL/L (ref 3.5–5)
RBC # BLD AUTO: 2.68 10E6/UL (ref 3.8–5.2)
SODIUM SERPL-SCNC: 136 MMOL/L (ref 136–145)
WBC # BLD AUTO: 5.3 10E3/UL (ref 4–11)

## 2022-03-22 PROCEDURE — 36415 COLL VENOUS BLD VENIPUNCTURE: CPT | Performed by: INTERNAL MEDICINE

## 2022-03-22 PROCEDURE — 80048 BASIC METABOLIC PNL TOTAL CA: CPT | Performed by: INTERNAL MEDICINE

## 2022-03-22 PROCEDURE — 85018 HEMOGLOBIN: CPT | Performed by: INTERNAL MEDICINE

## 2022-03-22 PROCEDURE — P9603 ONE-WAY ALLOW PRORATED MILES: HCPCS | Performed by: INTERNAL MEDICINE

## 2022-03-27 ENCOUNTER — LAB REQUISITION (OUTPATIENT)
Dept: LAB | Facility: CLINIC | Age: 72
End: 2022-03-27
Payer: COMMERCIAL

## 2022-03-27 DIAGNOSIS — A41.9 SEPSIS, UNSPECIFIED ORGANISM (H): ICD-10-CM

## 2022-03-29 LAB
ANION GAP SERPL CALCULATED.3IONS-SCNC: 10 MMOL/L (ref 5–18)
BUN SERPL-MCNC: 32 MG/DL (ref 8–22)
CALCIUM SERPL-MCNC: 9.5 MG/DL (ref 8.5–10.5)
CHLORIDE BLD-SCNC: 99 MMOL/L (ref 98–107)
CO2 SERPL-SCNC: 27 MMOL/L (ref 22–31)
CREAT SERPL-MCNC: 1.22 MG/DL (ref 0.6–1.1)
GFR SERPL CREATININE-BSD FRML MDRD: 48 ML/MIN/1.73M2
GLUCOSE BLD-MCNC: 125 MG/DL (ref 70–125)
POTASSIUM BLD-SCNC: 3.8 MMOL/L (ref 3.5–5)
SODIUM SERPL-SCNC: 136 MMOL/L (ref 136–145)

## 2022-03-29 PROCEDURE — 36415 COLL VENOUS BLD VENIPUNCTURE: CPT | Performed by: INTERNAL MEDICINE

## 2022-03-29 PROCEDURE — 80048 BASIC METABOLIC PNL TOTAL CA: CPT | Performed by: INTERNAL MEDICINE

## 2022-03-29 PROCEDURE — P9604 ONE-WAY ALLOW PRORATED TRIP: HCPCS | Performed by: INTERNAL MEDICINE

## 2022-03-31 ENCOUNTER — LAB REQUISITION (OUTPATIENT)
Dept: LAB | Facility: CLINIC | Age: 72
End: 2022-03-31
Payer: COMMERCIAL

## 2022-03-31 DIAGNOSIS — I48.91 UNSPECIFIED ATRIAL FIBRILLATION (H): ICD-10-CM

## 2022-04-01 LAB
ANION GAP SERPL CALCULATED.3IONS-SCNC: 16 MMOL/L (ref 5–18)
BASOPHILS # BLD AUTO: 0 10E3/UL (ref 0–0.2)
BASOPHILS NFR BLD AUTO: 0 %
BUN SERPL-MCNC: 25 MG/DL (ref 8–22)
CALCIUM SERPL-MCNC: 10 MG/DL (ref 8.5–10.5)
CHLORIDE BLD-SCNC: 98 MMOL/L (ref 98–107)
CO2 SERPL-SCNC: 23 MMOL/L (ref 22–31)
CREAT SERPL-MCNC: 1.3 MG/DL (ref 0.6–1.1)
EOSINOPHIL # BLD AUTO: 0.9 10E3/UL (ref 0–0.7)
EOSINOPHIL NFR BLD AUTO: 8 %
ERYTHROCYTE [DISTWIDTH] IN BLOOD BY AUTOMATED COUNT: 14.5 % (ref 10–15)
GFR SERPL CREATININE-BSD FRML MDRD: 44 ML/MIN/1.73M2
GLUCOSE BLD-MCNC: 142 MG/DL (ref 70–125)
HCT VFR BLD AUTO: 31.8 % (ref 35–47)
HGB BLD-MCNC: 10 G/DL (ref 11.7–15.7)
IMM GRANULOCYTES # BLD: 0 10E3/UL
IMM GRANULOCYTES NFR BLD: 0 %
LYMPHOCYTES # BLD AUTO: 2.2 10E3/UL (ref 0.8–5.3)
LYMPHOCYTES NFR BLD AUTO: 20 %
MCH RBC QN AUTO: 32.1 PG (ref 26.5–33)
MCHC RBC AUTO-ENTMCNC: 31.4 G/DL (ref 31.5–36.5)
MCV RBC AUTO: 102 FL (ref 78–100)
MONOCYTES # BLD AUTO: 0.9 10E3/UL (ref 0–1.3)
MONOCYTES NFR BLD AUTO: 8 %
NEUTROPHILS # BLD AUTO: 6.9 10E3/UL (ref 1.6–8.3)
NEUTROPHILS NFR BLD AUTO: 64 %
NRBC # BLD AUTO: 0 10E3/UL
NRBC BLD AUTO-RTO: 0 /100
PLATELET # BLD AUTO: 444 10E3/UL (ref 150–450)
POTASSIUM BLD-SCNC: 4.1 MMOL/L (ref 3.5–5)
RBC # BLD AUTO: 3.12 10E6/UL (ref 3.8–5.2)
SODIUM SERPL-SCNC: 137 MMOL/L (ref 136–145)
WBC # BLD AUTO: 10.9 10E3/UL (ref 4–11)

## 2022-04-01 PROCEDURE — 36415 COLL VENOUS BLD VENIPUNCTURE: CPT | Performed by: INTERNAL MEDICINE

## 2022-04-01 PROCEDURE — 85014 HEMATOCRIT: CPT | Performed by: INTERNAL MEDICINE

## 2022-04-01 PROCEDURE — 80048 BASIC METABOLIC PNL TOTAL CA: CPT | Performed by: INTERNAL MEDICINE

## 2022-04-01 PROCEDURE — P9604 ONE-WAY ALLOW PRORATED TRIP: HCPCS | Performed by: INTERNAL MEDICINE

## 2022-04-04 ENCOUNTER — LAB REQUISITION (OUTPATIENT)
Dept: LAB | Facility: CLINIC | Age: 72
End: 2022-04-04
Payer: COMMERCIAL

## 2022-04-04 DIAGNOSIS — Z13.228 ENCOUNTER FOR SCREENING FOR OTHER METABOLIC DISORDERS: ICD-10-CM

## 2022-04-05 LAB
ANION GAP SERPL CALCULATED.3IONS-SCNC: 14 MMOL/L (ref 5–18)
BUN SERPL-MCNC: 37 MG/DL (ref 8–22)
CALCIUM SERPL-MCNC: 9.4 MG/DL (ref 8.5–10.5)
CHLORIDE BLD-SCNC: 99 MMOL/L (ref 98–107)
CO2 SERPL-SCNC: 25 MMOL/L (ref 22–31)
CREAT SERPL-MCNC: 1.36 MG/DL (ref 0.6–1.1)
GFR SERPL CREATININE-BSD FRML MDRD: 42 ML/MIN/1.73M2
GLUCOSE BLD-MCNC: 129 MG/DL (ref 70–125)
POTASSIUM BLD-SCNC: 3.9 MMOL/L (ref 3.5–5)
SODIUM SERPL-SCNC: 138 MMOL/L (ref 136–145)

## 2022-04-05 PROCEDURE — 80048 BASIC METABOLIC PNL TOTAL CA: CPT | Performed by: INTERNAL MEDICINE

## 2022-04-05 PROCEDURE — P9604 ONE-WAY ALLOW PRORATED TRIP: HCPCS | Performed by: INTERNAL MEDICINE

## 2022-04-05 PROCEDURE — 36415 COLL VENOUS BLD VENIPUNCTURE: CPT | Performed by: INTERNAL MEDICINE

## 2022-04-14 ENCOUNTER — LAB REQUISITION (OUTPATIENT)
Dept: LAB | Facility: CLINIC | Age: 72
End: 2022-04-14
Payer: COMMERCIAL

## 2022-04-14 DIAGNOSIS — R65.20 SEVERE SEPSIS WITHOUT SEPTIC SHOCK (CODE) (H): ICD-10-CM

## 2022-04-15 LAB
ANION GAP SERPL CALCULATED.3IONS-SCNC: 15 MMOL/L (ref 5–18)
BUN SERPL-MCNC: 62 MG/DL (ref 8–22)
CALCIUM SERPL-MCNC: 9.9 MG/DL (ref 8.5–10.5)
CHLORIDE BLD-SCNC: 99 MMOL/L (ref 98–107)
CO2 SERPL-SCNC: 22 MMOL/L (ref 22–31)
CREAT SERPL-MCNC: 2.11 MG/DL (ref 0.6–1.1)
ERYTHROCYTE [DISTWIDTH] IN BLOOD BY AUTOMATED COUNT: 14.8 % (ref 10–15)
GFR SERPL CREATININE-BSD FRML MDRD: 25 ML/MIN/1.73M2
GLUCOSE BLD-MCNC: 160 MG/DL (ref 70–125)
HCT VFR BLD AUTO: 32.2 % (ref 35–47)
HGB BLD-MCNC: 10.2 G/DL (ref 11.7–15.7)
MCH RBC QN AUTO: 32.1 PG (ref 26.5–33)
MCHC RBC AUTO-ENTMCNC: 31.7 G/DL (ref 31.5–36.5)
MCV RBC AUTO: 101 FL (ref 78–100)
PLATELET # BLD AUTO: 305 10E3/UL (ref 150–450)
POTASSIUM BLD-SCNC: 4.6 MMOL/L (ref 3.5–5)
RBC # BLD AUTO: 3.18 10E6/UL (ref 3.8–5.2)
SODIUM SERPL-SCNC: 136 MMOL/L (ref 136–145)
WBC # BLD AUTO: 10.4 10E3/UL (ref 4–11)

## 2022-04-15 PROCEDURE — 85027 COMPLETE CBC AUTOMATED: CPT | Performed by: INTERNAL MEDICINE

## 2022-04-15 PROCEDURE — P9604 ONE-WAY ALLOW PRORATED TRIP: HCPCS | Performed by: INTERNAL MEDICINE

## 2022-04-15 PROCEDURE — 36415 COLL VENOUS BLD VENIPUNCTURE: CPT | Performed by: INTERNAL MEDICINE

## 2022-04-15 PROCEDURE — 80048 BASIC METABOLIC PNL TOTAL CA: CPT | Performed by: INTERNAL MEDICINE

## 2022-11-21 ENCOUNTER — MEDICAL CORRESPONDENCE (OUTPATIENT)
Dept: HEALTH INFORMATION MANAGEMENT | Facility: CLINIC | Age: 72
End: 2022-11-21

## 2023-03-30 ENCOUNTER — OFFICE VISIT (OUTPATIENT)
Dept: OPHTHALMOLOGY | Facility: CLINIC | Age: 73
End: 2023-03-30
Attending: OPHTHALMOLOGY
Payer: MEDICARE

## 2023-03-30 DIAGNOSIS — G47.33 OBSTRUCTIVE SLEEP APNEA (ADULT) (PEDIATRIC): ICD-10-CM

## 2023-03-30 DIAGNOSIS — E11.9 TYPE 2 DIABETES MELLITUS NOT AT GOAL (H): ICD-10-CM

## 2023-03-30 DIAGNOSIS — H25.13 AGE-RELATED NUCLEAR CATARACT OF BOTH EYES: Primary | ICD-10-CM

## 2023-03-30 DIAGNOSIS — Z98.890 HISTORY OF PTOSIS REPAIR: ICD-10-CM

## 2023-03-30 PROCEDURE — G0463 HOSPITAL OUTPT CLINIC VISIT: HCPCS | Performed by: OPHTHALMOLOGY

## 2023-03-30 PROCEDURE — 92014 COMPRE OPH EXAM EST PT 1/>: CPT | Mod: GC | Performed by: OPHTHALMOLOGY

## 2023-03-30 ASSESSMENT — REFRACTION_MANIFEST
OD_SPHERE: -1.25
OD_CYLINDER: +0.75
OS_CYLINDER: +0.50
OS_SPHERE: +2.25
OS_AXIS: 030
OD_ADD: +2.50
OD_CYLINDER: +0.75
OD_SPHERE: +1.25
OD_AXIS: 150
OD_AXIS: 150
OS_AXIS: 030
OS_CYLINDER: +0.50
OS_ADD: +2.50
OS_SPHERE: -0.25

## 2023-03-30 ASSESSMENT — TONOMETRY
OD_IOP_MMHG: 21
IOP_METHOD: TONOPEN
OS_IOP_MMHG: 19

## 2023-03-30 ASSESSMENT — VISUAL ACUITY
OD_PH_SC+: -2
OD_SC: 20/30
OS_SC: 20/20
METHOD: SNELLEN - LINEAR
OD_PH_SC: 20/20

## 2023-03-30 ASSESSMENT — CONF VISUAL FIELD
OS_INFERIOR_TEMPORAL_RESTRICTION: 0
OS_SUPERIOR_NASAL_RESTRICTION: 0
OS_NORMAL: 1
OS_INFERIOR_NASAL_RESTRICTION: 0
OD_SUPERIOR_TEMPORAL_RESTRICTION: 0
OD_INFERIOR_NASAL_RESTRICTION: 0
OD_NORMAL: 1
OD_INFERIOR_TEMPORAL_RESTRICTION: 0
OD_SUPERIOR_NASAL_RESTRICTION: 0
OS_SUPERIOR_TEMPORAL_RESTRICTION: 0

## 2023-03-30 ASSESSMENT — EXTERNAL EXAM - LEFT EYE: OS_EXAM: NORMAL

## 2023-03-30 ASSESSMENT — CUP TO DISC RATIO
OS_RATIO: 0.2
OD_RATIO: 0.2

## 2023-03-30 ASSESSMENT — EXTERNAL EXAM - RIGHT EYE: OD_EXAM: NORMAL

## 2023-03-30 NOTE — NURSING NOTE
Chief Complaints and History of Present Illnesses   Patient presents with     Diabetic Eye Exam     Chief Complaint(s) and History of Present Illness(es)     Diabetic Eye Exam           Comments    Pt. States that eyes have been more dry compared to normal. Feels as though there is a rough patch on inside of RUL that is irritating RE. No flashes or floaters BE. No change in VA BE.   Lab Results       Component                Value               Date                       A1C                      9.6                 07/02/2017                 A1C                      8.2                 10/24/2016                 A1C                      8.2                 08/17/2016                 A1C                      8.1                 05/12/2016                 A1C                      8.2                 04/07/2016            Barbara Whitfield COT 3:27 PM March 30, 2023

## 2024-03-31 NOTE — PROGRESS NOTES
Chief Complaint(s) and History of Present Illness(es)     Diabetic Eye Exam           Comments    Pt. States that eyes have been more dry compared to normal. Feels as   though there is a rough patch on inside of RUL that is irritating RE. No   flashes or floaters BE. No change in VA BE.   Lab Results       Component                Value               Date                       A1C                      9.6                 07/02/2017                 A1C                      8.2                 10/24/2016                 A1C                      8.2                 08/17/2016                 A1C                      8.1                 05/12/2016                 A1C                      8.2                 04/07/2016            Barbara Whitfield COT 3:27 PM March 30, 2023                Review of systems for the eyes was negative other than the pertinent positives/negatives listed in the HPI.    Patient states she has right eye foreign body sensation. Has been present for years. She knows she has dry eyes. When she applies artificial tears, it helps.    She has a history of strabismus surgery. She experienced double vision for the first time again last week. She is wondering if her eyes are decompensating.     HbA1c: 11.2% (1 month ago)    Assessment & Plan      Binta Dennis is a 72 year old female with the following diagnoses:   1. Age-related nuclear cataract of both eyes    2. Type 2 diabetes mellitus not at goal (H)    3. Obstructive Sleep Apnea    4. History of ptosis repair       Type 2 diabetes mellitus  -HbA1c: >10%  -No diabetic retinopathy on exam  -Patient is considering gastric bypass to assist weight loss and help diabetes  -Counseled on need for improved blood glucose control.   -Monitor annually    pseudophakia, both eyes  -BCVA: 20/20 each eye  -Monitor    Hx of strabismus surgery  -Operated on both eyes, 20+ years ago  -Previously worked up for Myasthenia Gravis, negative  -Able to fuse image as one in  primary gaze. Small angle exotropia on cover-uncover.  -Right eye over shoots in adduction, -1 abduction  -Left eye -2 abduction  -Prefers to monitor at this time. Does not like the idea of prism glasses    ELI  Hx of ptosis repair by [facility] (Dr. Garner), 2018  -No ptosis  -No floppy eyelid  -Monitor    Myopia with astigmatism and presbyopia, both eyes  Refractive options reviewed  Refraction given     Patient disposition:   Return in about 1 year (around 3/30/2024) for DFE.         Attending Physician Attestation:  Complete documentation of historical and exam elements from today's encounter can be found in the full encounter summary report (not reduplicated in this progress note).  I personally obtained the chief complaint(s) and history of present illness.  I confirmed and edited as necessary the review of systems, past medical/surgical history, family history, social history, and examination findings as documented by others; and I examined the patient myself.  I personally reviewed the relevant tests, images, and reports as documented above.  I formulated and edited as necessary the assessment and plan and discussed the findings and management plan with the patient and family. . - Addy Razo MD      show

## 2024-11-13 ENCOUNTER — TELEPHONE (OUTPATIENT)
Dept: CARDIOLOGY | Facility: CLINIC | Age: 74
End: 2024-11-13
Payer: COMMERCIAL

## 2024-11-13 NOTE — TELEPHONE ENCOUNTER
Pt called and stated she was returning a call to this number but didn't know what it was about. Per chart review pt is not currently followed by [facility] Cardiology Team. Pt was able to confirm her last visit with a cardiologist was in 2022. Per chart review pt followed by [facility]. Explained to patient no call was placed to her from [phone]

## (undated) DEVICE — ESU GROUND PAD ADULT W/CORD E7507

## (undated) DEVICE — EYE PREP BETADINE 5% SOLUTION 30ML 0065-0411-30

## (undated) DEVICE — SOL NACL 0.9% IRRIG 1000ML BOTTLE 2F7124

## (undated) DEVICE — GLOVE PROTEXIS MICRO 6.0  2D73PM60

## (undated) DEVICE — Device

## (undated) DEVICE — ESU NDL COLORADO MICRO 3CM STR N103A

## (undated) DEVICE — BLADE KNIFE SURG 11 371111

## (undated) DEVICE — PREP POVIDONE IODINE SOLUTION 10% 120ML

## (undated) DEVICE — SU PLAIN FAST ABSORB 6-0 PC-1 18" 1916G

## (undated) DEVICE — PREP POVIDONE IODINE SCRUB 7.5% 120ML

## (undated) DEVICE — NDL ANGIOCATH 14GA 1.25" 3068

## (undated) DEVICE — DRAPE LEGGINGS 8421

## (undated) DEVICE — DRAPE STERI TOWEL LG 1010

## (undated) DEVICE — SOL WATER IRRIG 1000ML BOTTLE 2F7114

## (undated) DEVICE — DRSG TELFA 2X3"

## (undated) DEVICE — MAGSTIM ELECTRODE NEUROSIGN 100 FACIAL NERVE

## (undated) DEVICE — ESU CORD BIPOLAR GREEN 10-4000

## (undated) DEVICE — SOL NACL 0.9% IRRIG 1000ML BOTTLE 07138-09

## (undated) DEVICE — TUBING IV EXTENSION SET ANESTHESIA 34" MLL 2C6227

## (undated) DEVICE — PREP SKIN SCRUB TRAY 4461A

## (undated) DEVICE — SU PLAIN 6-0 G-1DA 18" 770G

## (undated) DEVICE — DRSG GLASSCOCK PED S-1003

## (undated) DEVICE — BLADE CLIPPER SGL USE 9680

## (undated) DEVICE — DRSG STERI STRIP 1/2X4" R1547

## (undated) DEVICE — TUBING IRR DRILL COOLFLEX E9415A

## (undated) DEVICE — TUBING SUCTION 12"X1/4" N612

## (undated) DEVICE — BAG CLEAR TRASH 1.3M 39X33" P4040C

## (undated) DEVICE — DRAPE MICRO ZEISS OPMI 137X381CM 306070-0000-000

## (undated) DEVICE — BASIN SET MAJOR

## (undated) DEVICE — SOL ADH LIQUID BENZOIN SWAB 0.6ML C1544

## (undated) DEVICE — LINEN HALF SHEET 5512

## (undated) DEVICE — PACK OCULOPLATIC SEN15OCFSD

## (undated) DEVICE — ESU ELEC BLADE 2.75" COATED/INSULATED E1455

## (undated) DEVICE — SYR 03ML LL W/O NDL 309657

## (undated) DEVICE — DRAIN PENROSE 0.25"X12" LATEX FREE GR101

## (undated) DEVICE — ESU PENCIL W/HOLSTER

## (undated) DEVICE — SU VICRYL 3-0 PS-2 27" UND J427H

## (undated) DEVICE — GLOVE PROTEXIS POWDER FREE SMT 7.5  2D72PT75X

## (undated) DEVICE — LINEN TOWEL PACK X5 5464

## (undated) DEVICE — LINEN TOWEL PACK X10 5473

## (undated) DEVICE — GLOVE PROTEXIS W/NEU-THERA 7.5  2D73TE75

## (undated) DEVICE — LINEN FULL SHEET 5511

## (undated) DEVICE — DRAPE MAYO STAND 23X54 8337

## (undated) DEVICE — ESU PENCIL W/HOLSTER E2350H

## (undated) DEVICE — PACK ENT PLASTICS RIDGES

## (undated) DEVICE — SUCTION MANIFOLD NEPTUNE 2 SYS 4 PORT 0702-020-000

## (undated) DEVICE — SPONGE SURGIFOAM 12 1972

## (undated) DEVICE — ESU EYE HIGH TEMP 65410-183

## (undated) DEVICE — SOL NACL 0.9% INJ 1000ML BAG 07983-09

## (undated) RX ORDER — FENTANYL CITRATE-0.9 % NACL/PF 10 MCG/ML
PLASTIC BAG, INJECTION (ML) INTRAVENOUS
Status: DISPENSED
Start: 2021-10-08

## (undated) RX ORDER — DEXAMETHASONE SODIUM PHOSPHATE 4 MG/ML
INJECTION, SOLUTION INTRA-ARTICULAR; INTRALESIONAL; INTRAMUSCULAR; INTRAVENOUS; SOFT TISSUE
Status: DISPENSED
Start: 2018-09-21

## (undated) RX ORDER — CIPROFLOXACIN AND DEXAMETHASONE 3; 1 MG/ML; MG/ML
SUSPENSION/ DROPS AURICULAR (OTIC)
Status: DISPENSED
Start: 2021-10-08

## (undated) RX ORDER — EPINEPHRINE NASAL SOLUTION 1 MG/ML
SOLUTION NASAL
Status: DISPENSED
Start: 2021-10-08

## (undated) RX ORDER — CLINDAMYCIN PHOSPHATE 900 MG/50ML
INJECTION, SOLUTION INTRAVENOUS
Status: DISPENSED
Start: 2021-10-08

## (undated) RX ORDER — FENTANYL CITRATE 50 UG/ML
INJECTION, SOLUTION INTRAMUSCULAR; INTRAVENOUS
Status: DISPENSED
Start: 2021-10-08

## (undated) RX ORDER — PHENYLEPHRINE HYDROCHLORIDE 10 MG/ML
INJECTION INTRAVENOUS
Status: DISPENSED
Start: 2021-10-08

## (undated) RX ORDER — GINSENG 100 MG
CAPSULE ORAL
Status: DISPENSED
Start: 2021-10-08

## (undated) RX ORDER — ONDANSETRON 2 MG/ML
INJECTION INTRAMUSCULAR; INTRAVENOUS
Status: DISPENSED
Start: 2018-09-21

## (undated) RX ORDER — LIDOCAINE HYDROCHLORIDE 20 MG/ML
INJECTION, SOLUTION EPIDURAL; INFILTRATION; INTRACAUDAL; PERINEURAL
Status: DISPENSED
Start: 2018-09-21

## (undated) RX ORDER — LIDOCAINE HYDROCHLORIDE 20 MG/ML
INJECTION, SOLUTION EPIDURAL; INFILTRATION; INTRACAUDAL; PERINEURAL
Status: DISPENSED
Start: 2021-10-08

## (undated) RX ORDER — FENTANYL CITRATE 50 UG/ML
INJECTION, SOLUTION INTRAMUSCULAR; INTRAVENOUS
Status: DISPENSED
Start: 2018-09-21

## (undated) RX ORDER — TRAMADOL HYDROCHLORIDE 50 MG/1
TABLET ORAL
Status: DISPENSED
Start: 2018-09-21

## (undated) RX ORDER — PROPOFOL 10 MG/ML
INJECTION, EMULSION INTRAVENOUS
Status: DISPENSED
Start: 2021-10-08

## (undated) RX ORDER — LIDOCAINE HYDROCHLORIDE AND EPINEPHRINE 10; 10 MG/ML; UG/ML
INJECTION, SOLUTION INFILTRATION; PERINEURAL
Status: DISPENSED
Start: 2021-10-08